# Patient Record
Sex: FEMALE | Race: OTHER | HISPANIC OR LATINO
[De-identification: names, ages, dates, MRNs, and addresses within clinical notes are randomized per-mention and may not be internally consistent; named-entity substitution may affect disease eponyms.]

---

## 2017-01-25 ENCOUNTER — APPOINTMENT (OUTPATIENT)
Dept: ELECTROPHYSIOLOGY | Facility: CLINIC | Age: 82
End: 2017-01-25

## 2017-01-31 ENCOUNTER — APPOINTMENT (OUTPATIENT)
Dept: ELECTROPHYSIOLOGY | Facility: CLINIC | Age: 82
End: 2017-01-31

## 2017-01-31 ENCOUNTER — NON-APPOINTMENT (OUTPATIENT)
Age: 82
End: 2017-01-31

## 2017-01-31 ENCOUNTER — APPOINTMENT (OUTPATIENT)
Dept: CARDIOLOGY | Facility: CLINIC | Age: 82
End: 2017-01-31

## 2017-01-31 VITALS — SYSTOLIC BLOOD PRESSURE: 122 MMHG | DIASTOLIC BLOOD PRESSURE: 69 MMHG | HEART RATE: 87 BPM | OXYGEN SATURATION: 96 %

## 2017-02-16 ENCOUNTER — OUTPATIENT (OUTPATIENT)
Dept: OUTPATIENT SERVICES | Facility: HOSPITAL | Age: 82
LOS: 1 days | Discharge: HOME | End: 2017-02-16

## 2017-02-16 DIAGNOSIS — Z90.710 ACQUIRED ABSENCE OF BOTH CERVIX AND UTERUS: Chronic | ICD-10-CM

## 2017-02-16 DIAGNOSIS — D69.6 THROMBOCYTOPENIA, UNSPECIFIED: ICD-10-CM

## 2017-02-20 ENCOUNTER — RESULT REVIEW (OUTPATIENT)
Age: 82
End: 2017-02-20

## 2017-02-21 ENCOUNTER — APPOINTMENT (OUTPATIENT)
Dept: HEMATOLOGY ONCOLOGY | Facility: CLINIC | Age: 82
End: 2017-02-21

## 2017-02-21 VITALS
BODY MASS INDEX: 24.8 KG/M2 | SYSTOLIC BLOOD PRESSURE: 140 MMHG | TEMPERATURE: 97 F | DIASTOLIC BLOOD PRESSURE: 80 MMHG | HEART RATE: 88 BPM | WEIGHT: 107.14 LBS | HEIGHT: 55 IN | RESPIRATION RATE: 16 BRPM

## 2017-02-21 LAB
BASOPHILS # BLD AUTO: 0 K/UL — SIGNIFICANT CHANGE UP (ref 0–0.2)
BASOPHILS NFR BLD AUTO: 0.8 % — SIGNIFICANT CHANGE UP (ref 0–2)
EOSINOPHIL # BLD AUTO: 0 K/UL — SIGNIFICANT CHANGE UP (ref 0–0.5)
EOSINOPHIL NFR BLD AUTO: 1.1 % — SIGNIFICANT CHANGE UP (ref 0–6)
HCT VFR BLD CALC: 35.7 % — SIGNIFICANT CHANGE UP (ref 34.5–45)
HGB BLD-MCNC: 11.9 G/DL — SIGNIFICANT CHANGE UP (ref 11.5–15.5)
LYMPHOCYTES # BLD AUTO: 0.8 K/UL — LOW (ref 1–3.3)
LYMPHOCYTES # BLD AUTO: 22.6 % — SIGNIFICANT CHANGE UP (ref 13–44)
MCHC RBC-ENTMCNC: 28.7 PG — SIGNIFICANT CHANGE UP (ref 27–34)
MCHC RBC-ENTMCNC: 33.5 G/DL — SIGNIFICANT CHANGE UP (ref 32–36)
MCV RBC AUTO: 85.9 FL — SIGNIFICANT CHANGE UP (ref 80–100)
MONOCYTES # BLD AUTO: 1 K/UL — HIGH (ref 0–0.9)
MONOCYTES NFR BLD AUTO: 29 % — HIGH (ref 2–14)
NEUTROPHILS # BLD AUTO: 1.6 K/UL — LOW (ref 1.8–7.4)
NEUTROPHILS NFR BLD AUTO: 46.4 % — SIGNIFICANT CHANGE UP (ref 43–77)
PLATELET # BLD AUTO: 88 K/UL — LOW (ref 150–400)
RBC # BLD: 4.15 M/UL — SIGNIFICANT CHANGE UP (ref 3.8–5.2)
RBC # FLD: 16.7 % — HIGH (ref 10.3–14.5)
WBC # BLD: 3.3 K/UL — LOW (ref 3.8–10.5)
WBC # FLD AUTO: 3.3 K/UL — LOW (ref 3.8–10.5)

## 2017-02-28 ENCOUNTER — APPOINTMENT (OUTPATIENT)
Dept: ELECTROPHYSIOLOGY | Facility: CLINIC | Age: 82
End: 2017-02-28

## 2017-03-06 ENCOUNTER — RESULT REVIEW (OUTPATIENT)
Age: 82
End: 2017-03-06

## 2017-03-07 ENCOUNTER — APPOINTMENT (OUTPATIENT)
Dept: HEMATOLOGY ONCOLOGY | Facility: CLINIC | Age: 82
End: 2017-03-07

## 2017-03-07 VITALS
BODY MASS INDEX: 28.4 KG/M2 | SYSTOLIC BLOOD PRESSURE: 163 MMHG | DIASTOLIC BLOOD PRESSURE: 63 MMHG | RESPIRATION RATE: 16 BRPM | OXYGEN SATURATION: 98 % | TEMPERATURE: 98.8 F | WEIGHT: 112.43 LBS | HEART RATE: 75 BPM

## 2017-03-07 LAB
ANISOCYTOSIS BLD QL: SLIGHT — SIGNIFICANT CHANGE UP
BASO STIPL BLD QL SMEAR: PRESENT — SIGNIFICANT CHANGE UP
BASOPHILS # BLD AUTO: 0 K/UL — SIGNIFICANT CHANGE UP (ref 0–0.2)
EOSINOPHIL # BLD AUTO: 0.2 K/UL — SIGNIFICANT CHANGE UP (ref 0–0.5)
HCT VFR BLD CALC: 33.6 % — LOW (ref 34.5–45)
HGB BLD-MCNC: 11.3 G/DL — LOW (ref 11.5–15.5)
LYMPHOCYTES # BLD AUTO: 1 K/UL — SIGNIFICANT CHANGE UP (ref 1–3.3)
LYMPHOCYTES # BLD AUTO: 17 % — SIGNIFICANT CHANGE UP (ref 13–44)
MACROCYTES BLD QL: SLIGHT — SIGNIFICANT CHANGE UP
MCHC RBC-ENTMCNC: 28.4 PG — SIGNIFICANT CHANGE UP (ref 27–34)
MCHC RBC-ENTMCNC: 33.6 G/DL — SIGNIFICANT CHANGE UP (ref 32–36)
MCV RBC AUTO: 84.6 FL — SIGNIFICANT CHANGE UP (ref 80–100)
MONOCYTES # BLD AUTO: 2.4 K/UL — HIGH (ref 0–0.9)
MONOCYTES NFR BLD AUTO: 39 % — HIGH (ref 2–14)
NEUTROPHILS # BLD AUTO: 3.2 K/UL — SIGNIFICANT CHANGE UP (ref 1.8–7.4)
NEUTROPHILS NFR BLD AUTO: 44 % — SIGNIFICANT CHANGE UP (ref 43–77)
NRBC # BLD: 1 /100 — HIGH (ref 0–0)
PLAT MORPH BLD: NORMAL — SIGNIFICANT CHANGE UP
PLATELET # BLD AUTO: 73 K/UL — LOW (ref 150–400)
POLYCHROMASIA BLD QL SMEAR: SLIGHT — SIGNIFICANT CHANGE UP
RBC # BLD: 3.97 M/UL — SIGNIFICANT CHANGE UP (ref 3.8–5.2)
RBC # FLD: 17.1 % — HIGH (ref 10.3–14.5)
RBC BLD AUTO: SIGNIFICANT CHANGE UP
WBC # BLD: 6.8 K/UL — SIGNIFICANT CHANGE UP (ref 3.8–10.5)
WBC # FLD AUTO: 6.8 K/UL — SIGNIFICANT CHANGE UP (ref 3.8–10.5)

## 2017-05-25 ENCOUNTER — APPOINTMENT (OUTPATIENT)
Dept: CARDIOLOGY | Facility: CLINIC | Age: 82
End: 2017-05-25

## 2017-05-25 ENCOUNTER — NON-APPOINTMENT (OUTPATIENT)
Age: 82
End: 2017-05-25

## 2017-05-25 ENCOUNTER — APPOINTMENT (OUTPATIENT)
Dept: ELECTROPHYSIOLOGY | Facility: CLINIC | Age: 82
End: 2017-05-25

## 2017-05-25 VITALS — SYSTOLIC BLOOD PRESSURE: 110 MMHG | DIASTOLIC BLOOD PRESSURE: 66 MMHG

## 2017-05-25 VITALS — HEART RATE: 88 BPM | DIASTOLIC BLOOD PRESSURE: 66 MMHG | SYSTOLIC BLOOD PRESSURE: 164 MMHG

## 2017-05-27 LAB
ALBUMIN MFR SERPL ELPH: 53.3 %
ALBUMIN SERPL ELPH-MCNC: 4.3 G/DL
ALBUMIN SERPL-MCNC: 4.2 G/DL
ALBUMIN/GLOB SERPL: 1.2 RATIO
ALBUPE: 81.3 %
ALP BLD-CCNC: 52 U/L
ALPHA1 GLOB MFR SERPL ELPH: 3.7 %
ALPHA1 GLOB SERPL ELPH-MCNC: 0.3 G/DL
ALPHA1UPE: 4.3 %
ALPHA2 GLOB MFR SERPL ELPH: 11.5 %
ALPHA2 GLOB SERPL ELPH-MCNC: 0.9 G/DL
ALPHA2UPE: 3 %
ALT SERPL-CCNC: 10 U/L
ANION GAP SERPL CALC-SCNC: 16 MMOL/L
AST SERPL-CCNC: 17 U/L
B-GLOBULIN MFR SERPL ELPH: 12.5 %
B-GLOBULIN SERPL ELPH-MCNC: 1 G/DL
BETAUPE: 6.4 %
BILIRUB SERPL-MCNC: 0.8 MG/DL
BUN SERPL-MCNC: 18 MG/DL
CALCIUM SERPL-MCNC: 9.2 MG/DL
CHLORIDE SERPL-SCNC: 104 MMOL/L
CO2 SERPL-SCNC: 24 MMOL/L
CREAT 24H UR-MCNC: NORMAL G/24 H
CREAT SERPL-MCNC: 0.93 MG/DL
CREATININE UR (MAYO): 85 MG/DL
DEPRECATED KAPPA LC FREE/LAMBDA SER: 1.42 RATIO
FOLATE RBC-MCNC: 1121 NG/ML
GAMMA GLOB FLD ELPH-MCNC: 1.5 G/DL
GAMMA GLOB MFR SERPL ELPH: 19 %
GAMMAUPE: 5 %
GLUCOSE SERPL-MCNC: 101 MG/DL
HCT VFR BLD CALC: 37 %
IGA 24H UR QL IFE: NORMAL
INTERPRETATION SERPL IEP-IMP: NORMAL
KAPPA LC 24H UR QL: NORMAL
KAPPA LC CSF-MCNC: 3.26 MG/DL
KAPPA LC SERPL-MCNC: 4.63 MG/DL
METHYLMALONATE SERPL-SCNC: 324 NMOL/L
POTASSIUM SERPL-SCNC: 4.4 MMOL/L
PROT PATTERN 24H UR ELPH-IMP: NORMAL
PROT SERPL-MCNC: 7.8 G/DL
PROT UR-MCNC: 194 MG/DL
PROT UR-MCNC: 194 MG/DL
SODIUM SERPL-SCNC: 144 MMOL/L
SPECIMEN VOL 24H UR: NORMAL ML

## 2017-05-30 ENCOUNTER — APPOINTMENT (OUTPATIENT)
Dept: VASCULAR SURGERY | Facility: CLINIC | Age: 82
End: 2017-05-30

## 2017-05-31 ENCOUNTER — OUTPATIENT (OUTPATIENT)
Dept: OUTPATIENT SERVICES | Facility: HOSPITAL | Age: 82
LOS: 1 days | Discharge: HOME | End: 2017-05-31

## 2017-05-31 DIAGNOSIS — D69.6 THROMBOCYTOPENIA, UNSPECIFIED: ICD-10-CM

## 2017-05-31 DIAGNOSIS — Z90.710 ACQUIRED ABSENCE OF BOTH CERVIX AND UTERUS: Chronic | ICD-10-CM

## 2017-06-05 ENCOUNTER — APPOINTMENT (OUTPATIENT)
Dept: VASCULAR SURGERY | Facility: CLINIC | Age: 82
End: 2017-06-05

## 2017-06-05 VITALS
HEART RATE: 80 BPM | BODY MASS INDEX: 25.92 KG/M2 | HEIGHT: 55 IN | TEMPERATURE: 98.3 F | DIASTOLIC BLOOD PRESSURE: 75 MMHG | WEIGHT: 112 LBS | SYSTOLIC BLOOD PRESSURE: 166 MMHG

## 2017-06-06 ENCOUNTER — APPOINTMENT (OUTPATIENT)
Dept: HEMATOLOGY ONCOLOGY | Facility: CLINIC | Age: 82
End: 2017-06-06

## 2017-07-07 ENCOUNTER — OUTPATIENT (OUTPATIENT)
Dept: OUTPATIENT SERVICES | Facility: HOSPITAL | Age: 82
LOS: 1 days | Discharge: HOME | End: 2017-07-07

## 2017-07-07 DIAGNOSIS — D69.6 THROMBOCYTOPENIA, UNSPECIFIED: ICD-10-CM

## 2017-07-07 DIAGNOSIS — Z90.710 ACQUIRED ABSENCE OF BOTH CERVIX AND UTERUS: Chronic | ICD-10-CM

## 2017-07-10 ENCOUNTER — APPOINTMENT (OUTPATIENT)
Dept: HEMATOLOGY ONCOLOGY | Facility: CLINIC | Age: 82
End: 2017-07-10

## 2017-07-12 ENCOUNTER — APPOINTMENT (OUTPATIENT)
Dept: HEMATOLOGY ONCOLOGY | Facility: CLINIC | Age: 82
End: 2017-07-12

## 2017-08-07 ENCOUNTER — RX RENEWAL (OUTPATIENT)
Age: 82
End: 2017-08-07

## 2017-08-29 ENCOUNTER — OUTPATIENT (OUTPATIENT)
Dept: OUTPATIENT SERVICES | Facility: HOSPITAL | Age: 82
LOS: 1 days | Discharge: HOME | End: 2017-08-29

## 2017-08-29 DIAGNOSIS — D69.6 THROMBOCYTOPENIA, UNSPECIFIED: ICD-10-CM

## 2017-08-29 DIAGNOSIS — Z90.710 ACQUIRED ABSENCE OF BOTH CERVIX AND UTERUS: Chronic | ICD-10-CM

## 2017-09-05 ENCOUNTER — APPOINTMENT (OUTPATIENT)
Dept: HEMATOLOGY ONCOLOGY | Facility: CLINIC | Age: 82
End: 2017-09-05
Payer: COMMERCIAL

## 2017-09-05 ENCOUNTER — RESULT REVIEW (OUTPATIENT)
Age: 82
End: 2017-09-05

## 2017-09-05 VITALS
SYSTOLIC BLOOD PRESSURE: 162 MMHG | TEMPERATURE: 97.9 F | RESPIRATION RATE: 16 BRPM | BODY MASS INDEX: 27.28 KG/M2 | DIASTOLIC BLOOD PRESSURE: 60 MMHG | OXYGEN SATURATION: 98 % | WEIGHT: 108.02 LBS | HEART RATE: 68 BPM

## 2017-09-05 LAB
BASOPHILS # BLD AUTO: 0 K/UL — SIGNIFICANT CHANGE UP (ref 0–0.2)
BASOPHILS NFR BLD AUTO: 0.1 % — SIGNIFICANT CHANGE UP (ref 0–2)
EOSINOPHIL # BLD AUTO: 0 K/UL — SIGNIFICANT CHANGE UP (ref 0–0.5)
EOSINOPHIL NFR BLD AUTO: 1 % — SIGNIFICANT CHANGE UP (ref 0–6)
HCT VFR BLD CALC: 33.9 % — LOW (ref 34.5–45)
HGB BLD-MCNC: 11.4 G/DL — LOW (ref 11.5–15.5)
LYMPHOCYTES # BLD AUTO: 1 K/UL — SIGNIFICANT CHANGE UP (ref 1–3.3)
LYMPHOCYTES # BLD AUTO: 24.1 % — SIGNIFICANT CHANGE UP (ref 13–44)
MCHC RBC-ENTMCNC: 27.9 PG — SIGNIFICANT CHANGE UP (ref 27–34)
MCHC RBC-ENTMCNC: 33.6 G/DL — SIGNIFICANT CHANGE UP (ref 32–36)
MCV RBC AUTO: 83.1 FL — SIGNIFICANT CHANGE UP (ref 80–100)
MONOCYTES # BLD AUTO: 1.5 K/UL — HIGH (ref 0–0.9)
MONOCYTES NFR BLD AUTO: 35 % — HIGH (ref 2–14)
NEUTROPHILS # BLD AUTO: 1.7 K/UL — LOW (ref 1.8–7.4)
NEUTROPHILS NFR BLD AUTO: 39.9 % — LOW (ref 43–77)
PLATELET # BLD AUTO: 73 K/UL — LOW (ref 150–400)
RBC # BLD: 4.08 M/UL — SIGNIFICANT CHANGE UP (ref 3.8–5.2)
RBC # FLD: 18.3 % — HIGH (ref 10.3–14.5)
WBC # BLD: 4.3 K/UL — SIGNIFICANT CHANGE UP (ref 3.8–10.5)
WBC # FLD AUTO: 4.3 K/UL — SIGNIFICANT CHANGE UP (ref 3.8–10.5)

## 2017-09-05 PROCEDURE — 99213 OFFICE O/P EST LOW 20 MIN: CPT

## 2017-11-28 ENCOUNTER — NON-APPOINTMENT (OUTPATIENT)
Age: 82
End: 2017-11-28

## 2017-11-28 ENCOUNTER — APPOINTMENT (OUTPATIENT)
Dept: ELECTROPHYSIOLOGY | Facility: CLINIC | Age: 82
End: 2017-11-28
Payer: COMMERCIAL

## 2017-11-28 ENCOUNTER — APPOINTMENT (OUTPATIENT)
Dept: CARDIOLOGY | Facility: CLINIC | Age: 82
End: 2017-11-28
Payer: COMMERCIAL

## 2017-11-28 VITALS
BODY MASS INDEX: 27.28 KG/M2 | HEART RATE: 94 BPM | SYSTOLIC BLOOD PRESSURE: 153 MMHG | OXYGEN SATURATION: 97 % | DIASTOLIC BLOOD PRESSURE: 72 MMHG | WEIGHT: 108 LBS

## 2017-11-28 VITALS — SYSTOLIC BLOOD PRESSURE: 132 MMHG | DIASTOLIC BLOOD PRESSURE: 70 MMHG

## 2017-11-28 PROCEDURE — 99215 OFFICE O/P EST HI 40 MIN: CPT

## 2017-11-28 PROCEDURE — 93280 PM DEVICE PROGR EVAL DUAL: CPT

## 2018-01-01 ENCOUNTER — OUTPATIENT (OUTPATIENT)
Dept: OUTPATIENT SERVICES | Facility: HOSPITAL | Age: 83
LOS: 1 days | End: 2018-01-01
Payer: MEDICAID

## 2018-01-01 DIAGNOSIS — Z90.710 ACQUIRED ABSENCE OF BOTH CERVIX AND UTERUS: Chronic | ICD-10-CM

## 2018-01-01 PROCEDURE — G9001: CPT

## 2018-01-10 ENCOUNTER — EMERGENCY (EMERGENCY)
Facility: HOSPITAL | Age: 83
LOS: 1 days | Discharge: HOME | End: 2018-01-10
Attending: EMERGENCY MEDICINE | Admitting: EMERGENCY MEDICINE
Payer: MEDICARE

## 2018-01-10 VITALS — HEART RATE: 88 BPM | DIASTOLIC BLOOD PRESSURE: 84 MMHG | SYSTOLIC BLOOD PRESSURE: 170 MMHG | OXYGEN SATURATION: 92 %

## 2018-01-10 DIAGNOSIS — Z90.710 ACQUIRED ABSENCE OF BOTH CERVIX AND UTERUS: Chronic | ICD-10-CM

## 2018-01-10 LAB
ALBUMIN SERPL ELPH-MCNC: 4.2 G/DL — SIGNIFICANT CHANGE UP (ref 3.3–5)
ALP SERPL-CCNC: 54 U/L — SIGNIFICANT CHANGE UP (ref 40–120)
ALT FLD-CCNC: 9 U/L RC — LOW (ref 10–45)
ANION GAP SERPL CALC-SCNC: 17 MMOL/L — SIGNIFICANT CHANGE UP (ref 5–17)
AST SERPL-CCNC: 20 U/L — SIGNIFICANT CHANGE UP (ref 10–40)
BASOPHILS # BLD AUTO: 0 K/UL — SIGNIFICANT CHANGE UP (ref 0–0.2)
BILIRUB SERPL-MCNC: 0.5 MG/DL — SIGNIFICANT CHANGE UP (ref 0.2–1.2)
BUN SERPL-MCNC: 18 MG/DL — SIGNIFICANT CHANGE UP (ref 7–23)
CALCIUM SERPL-MCNC: 9.3 MG/DL — SIGNIFICANT CHANGE UP (ref 8.4–10.5)
CHLORIDE SERPL-SCNC: 101 MMOL/L — SIGNIFICANT CHANGE UP (ref 96–108)
CO2 SERPL-SCNC: 22 MMOL/L — SIGNIFICANT CHANGE UP (ref 22–31)
CREAT SERPL-MCNC: 1.14 MG/DL — SIGNIFICANT CHANGE UP (ref 0.5–1.3)
EOSINOPHIL # BLD AUTO: 0 K/UL — SIGNIFICANT CHANGE UP (ref 0–0.5)
EOSINOPHIL NFR BLD AUTO: 3 % — SIGNIFICANT CHANGE UP (ref 0–6)
GLUCOSE SERPL-MCNC: 205 MG/DL — HIGH (ref 70–99)
HCT VFR BLD CALC: 34.1 % — LOW (ref 34.5–45)
HGB BLD-MCNC: 11 G/DL — LOW (ref 11.5–15.5)
LIDOCAIN IGE QN: 34 U/L — SIGNIFICANT CHANGE UP (ref 7–60)
LYMPHOCYTES # BLD AUTO: 0.8 K/UL — LOW (ref 1–3.3)
LYMPHOCYTES # BLD AUTO: 8 % — LOW (ref 13–44)
MCHC RBC-ENTMCNC: 27.8 PG — SIGNIFICANT CHANGE UP (ref 27–34)
MCHC RBC-ENTMCNC: 32.2 GM/DL — SIGNIFICANT CHANGE UP (ref 32–36)
MCV RBC AUTO: 86.5 FL — SIGNIFICANT CHANGE UP (ref 80–100)
MONOCYTES # BLD AUTO: 2.6 K/UL — HIGH (ref 0–0.9)
MONOCYTES NFR BLD AUTO: 14 % — SIGNIFICANT CHANGE UP (ref 2–14)
NEUTROPHILS # BLD AUTO: 9 K/UL — HIGH (ref 1.8–7.4)
NEUTROPHILS NFR BLD AUTO: 75 % — SIGNIFICANT CHANGE UP (ref 43–77)
PLATELET # BLD AUTO: 105 K/UL — LOW (ref 150–400)
POTASSIUM SERPL-MCNC: 3.6 MMOL/L — SIGNIFICANT CHANGE UP (ref 3.5–5.3)
POTASSIUM SERPL-SCNC: 3.6 MMOL/L — SIGNIFICANT CHANGE UP (ref 3.5–5.3)
PROT SERPL-MCNC: 7.7 G/DL — SIGNIFICANT CHANGE UP (ref 6–8.3)
RBC # BLD: 3.95 M/UL — SIGNIFICANT CHANGE UP (ref 3.8–5.2)
RBC # FLD: 17.5 % — HIGH (ref 10.3–14.5)
SODIUM SERPL-SCNC: 140 MMOL/L — SIGNIFICANT CHANGE UP (ref 135–145)
TROPONIN T SERPL-MCNC: <0.01 NG/ML — SIGNIFICANT CHANGE UP (ref 0–0.06)
WBC # BLD: 12.4 K/UL — HIGH (ref 3.8–10.5)
WBC # FLD AUTO: 12.4 K/UL — HIGH (ref 3.8–10.5)

## 2018-01-10 PROCEDURE — 71045 X-RAY EXAM CHEST 1 VIEW: CPT | Mod: 26

## 2018-01-10 PROCEDURE — 93010 ELECTROCARDIOGRAM REPORT: CPT

## 2018-01-10 PROCEDURE — 99285 EMERGENCY DEPT VISIT HI MDM: CPT | Mod: 25

## 2018-01-10 RX ORDER — SODIUM CHLORIDE 9 MG/ML
1000 INJECTION INTRAMUSCULAR; INTRAVENOUS; SUBCUTANEOUS ONCE
Qty: 0 | Refills: 0 | Status: COMPLETED | OUTPATIENT
Start: 2018-01-10 | End: 2018-01-10

## 2018-01-10 RX ADMIN — SODIUM CHLORIDE 1000 MILLILITER(S): 9 INJECTION INTRAMUSCULAR; INTRAVENOUS; SUBCUTANEOUS at 21:49

## 2018-01-10 NOTE — ED PROVIDER NOTE - MEDICAL DECISION MAKING DETAILS
89 yo F presents with episode of vomiting/diarrhea after eating eggs. she briefly had epigastric pain  but denies any at this time. PE normal with no abdominal TTP. VS stable.   ED workup 89 yo F presents with episode of vomiting/diarrhea after eating eggs. she briefly had epigastric pain  but denies any at this time. PE normal with no abdominal TTP. VS stable.   ED workup CXR with NAD. EKG no acute ischemic changes. UA with no infection. mild leukocytosis 12.4, but no source of infection found. mild anemia 11 and 34. labs otherwise unremarkable. delta troponin negative. pt was treated with iVFs in the ED. she remained asymptomatic without abdominal pain or vomiting int eh ER. she was observed eating and drinking without any N/V/D. she was ambulating around the ER and VS stable. no indication for CT a/p at this time as pt with no abd pain or TTP. pt was discharged with instructions to drink plenty of fluids and f/u with PMD in 1-2 days for repeat eval and further management    The patient was discharged from the ED in stable condition. All results of today's workup were discussed with the patient and all questions/concerns were addressed. All discharge instructions were thoroughly discussed with the patient, as well as important warning signs and new/ worsening symptoms which should necessitate patient's immediate return to the ED. The patient is agreeable with discharge and expresses full understanding of all instructions given.

## 2018-01-10 NOTE — ED PROVIDER NOTE - OBJECTIVE STATEMENT
89 yo F w/PMH macular degeneration, a-fib and HTN presenting for ABD pain. This morning pt went to doctor for steroid injection for macular degeneration. She did not eat anything until this evening. Pt had vomiting x1 and diarrhea x1 1 hour after eating eggs and then subsequently fainted. Her grandson caught her before she hit the ground. After the episode pt started feeling epigastric abdominal pain. Grandson also ate the eggs and feels fine. 1 month ago pt completed course of ABX for respiratory infection. Denies cough, blood in stool or dark stools, fevers, difficulty urinating, CP. 89 yo F w/PMH macular degeneration, a-fib and HTN presenting for ABD pain. This morning pt went to doctor for steroid injection for macular degeneration. She did not eat anything until this evening. Then patient went home age eggs, and 1 hour later had an episode of vomiting x1 and diarrhea x1. After the episode pt briefly felt epigastric pain, but resolved at this time. patient hadn't eaten all day before this. patient had no complaints prior to this V/D. She feels well at this time with no complaints. Son also ate the eggs and feels fine. 1 month ago pt completed course of ABX for respiratory infection. Denies cough, blood in stool or dark stools, fevers, difficulty urinating, CP.  patient is primarly spanich speaking, she declines  services and would like her son to translate 89 yo F w/PMH macular degeneration, a-fib and HTN presenting for V/D. This morning pt went to doctor for steroid injection for macular degeneration. She did not eat anything until this evening. Then patient went home ate eggs, and 1 hour later had an episode of vomiting x1 and diarrhea x1. After the episode pt briefly felt epigastric pain, but resolved at this time. patient hadn't eaten all day before this. patient had no complaints prior to this V/D. She feels well at this time with no complaints. 1 month ago pt completed course of ABX for respiratory infection. Denies cough, blood in stool or dark stools, fevers, difficulty urinating, CP.  patient is primarly spanich speaking, she declines  services and would like her son to translate

## 2018-01-10 NOTE — ED PROVIDER NOTE - CHIEF COMPLAINT
The patient is a 89y Female complaining of abdominal pain. The patient is a 89y Female complaining of V/D

## 2018-01-10 NOTE — ED ADULT NURSE NOTE - CHPI ED SYMPTOMS NEG
no hematuria/no fever/no blood in stool/no burning urination/no chills/no dysuria/no abdominal distension

## 2018-01-10 NOTE — ED ADULT NURSE NOTE - OBJECTIVE STATEMENT
pt is a 89 yr F presents with one hour of n/v/d, as per family pt had gone to her eye doctor to get a steroid injection and had not eaten anything all day. when she got home she had some eggs and tortilla. one hour after eating pt starting vomiting and had multiple episodes of diarrhea that she didn't make it to the bathroom for. pt while having BM had vasovagal episode for 2-3seconds. did not hit head, family caught her. pt reports current nausea, no longer vomiting. denies pain. abd soft, non-tender. denies cp/sob/fever/chills/dysuria/sick contacts. family at bedside, EKG complete.

## 2018-01-11 VITALS
HEART RATE: 75 BPM | DIASTOLIC BLOOD PRESSURE: 68 MMHG | SYSTOLIC BLOOD PRESSURE: 148 MMHG | OXYGEN SATURATION: 96 % | RESPIRATION RATE: 18 BRPM

## 2018-01-11 DIAGNOSIS — R69 ILLNESS, UNSPECIFIED: ICD-10-CM

## 2018-01-11 LAB
APPEARANCE UR: CLEAR — SIGNIFICANT CHANGE UP
BILIRUB UR-MCNC: NEGATIVE — SIGNIFICANT CHANGE UP
COLOR SPEC: YELLOW — SIGNIFICANT CHANGE UP
COMMENT - URINE: SIGNIFICANT CHANGE UP
CULTURE RESULTS: NO GROWTH — SIGNIFICANT CHANGE UP
DIFF PNL FLD: ABNORMAL
EPI CELLS # UR: SIGNIFICANT CHANGE UP /HPF
GLUCOSE UR QL: NEGATIVE — SIGNIFICANT CHANGE UP
HYALINE CASTS # UR AUTO: ABNORMAL
KETONES UR-MCNC: ABNORMAL
LEUKOCYTE ESTERASE UR-ACNC: NEGATIVE — SIGNIFICANT CHANGE UP
NITRITE UR-MCNC: NEGATIVE — SIGNIFICANT CHANGE UP
PH UR: 6.5 — SIGNIFICANT CHANGE UP (ref 5–8)
PROT UR-MCNC: 300 MG/DL
RBC CASTS # UR COMP ASSIST: ABNORMAL /HPF (ref 0–2)
SP GR SPEC: 1.02 — SIGNIFICANT CHANGE UP (ref 1.01–1.02)
SPECIMEN SOURCE: SIGNIFICANT CHANGE UP
TROPONIN T SERPL-MCNC: <0.01 NG/ML — SIGNIFICANT CHANGE UP (ref 0–0.06)
UROBILINOGEN FLD QL: NEGATIVE — SIGNIFICANT CHANGE UP
WBC UR QL: SIGNIFICANT CHANGE UP /HPF (ref 0–5)

## 2018-01-11 PROCEDURE — 87086 URINE CULTURE/COLONY COUNT: CPT

## 2018-01-11 PROCEDURE — 85027 COMPLETE CBC AUTOMATED: CPT

## 2018-01-11 PROCEDURE — 99283 EMERGENCY DEPT VISIT LOW MDM: CPT | Mod: 25

## 2018-01-11 PROCEDURE — 80053 COMPREHEN METABOLIC PANEL: CPT

## 2018-01-11 PROCEDURE — 82962 GLUCOSE BLOOD TEST: CPT

## 2018-01-11 PROCEDURE — 81001 URINALYSIS AUTO W/SCOPE: CPT

## 2018-01-11 PROCEDURE — 71045 X-RAY EXAM CHEST 1 VIEW: CPT

## 2018-01-11 PROCEDURE — 93005 ELECTROCARDIOGRAM TRACING: CPT

## 2018-01-11 PROCEDURE — 84484 ASSAY OF TROPONIN QUANT: CPT

## 2018-01-11 PROCEDURE — 83690 ASSAY OF LIPASE: CPT

## 2018-03-01 ENCOUNTER — OUTPATIENT (OUTPATIENT)
Dept: OUTPATIENT SERVICES | Facility: HOSPITAL | Age: 83
LOS: 1 days | Discharge: HOME | End: 2018-03-01

## 2018-03-01 DIAGNOSIS — D69.6 THROMBOCYTOPENIA, UNSPECIFIED: ICD-10-CM

## 2018-03-01 DIAGNOSIS — Z90.710 ACQUIRED ABSENCE OF BOTH CERVIX AND UTERUS: Chronic | ICD-10-CM

## 2018-03-06 ENCOUNTER — RESULT REVIEW (OUTPATIENT)
Age: 83
End: 2018-03-06

## 2018-03-06 ENCOUNTER — APPOINTMENT (OUTPATIENT)
Dept: HEMATOLOGY ONCOLOGY | Facility: CLINIC | Age: 83
End: 2018-03-06
Payer: COMMERCIAL

## 2018-03-06 VITALS
TEMPERATURE: 98 F | RESPIRATION RATE: 16 BRPM | WEIGHT: 104.6 LBS | BODY MASS INDEX: 26.42 KG/M2 | OXYGEN SATURATION: 99 % | DIASTOLIC BLOOD PRESSURE: 69 MMHG | HEART RATE: 73 BPM | SYSTOLIC BLOOD PRESSURE: 171 MMHG

## 2018-03-06 LAB
HCT VFR BLD CALC: 30.5 % — LOW (ref 34.5–45)
HGB BLD-MCNC: 10.5 G/DL — LOW (ref 11.5–15.5)
LYMPHOCYTES # BLD AUTO: 28 % — SIGNIFICANT CHANGE UP (ref 13–44)
LYMPHOCYTES # BLD AUTO: SIGNIFICANT CHANGE UP (ref 1–3.3)
MCHC RBC-ENTMCNC: 29 PG — SIGNIFICANT CHANGE UP (ref 27–34)
MCHC RBC-ENTMCNC: 34.4 G/DL — SIGNIFICANT CHANGE UP (ref 32–36)
MCV RBC AUTO: 84.3 FL — SIGNIFICANT CHANGE UP (ref 80–100)
MONOCYTES NFR BLD AUTO: 22 % — HIGH (ref 2–14)
NEUTROPHILS # BLD AUTO: 3.2 K/UL — SIGNIFICANT CHANGE UP (ref 1.8–7.4)
NEUTROPHILS NFR BLD AUTO: 50 % — SIGNIFICANT CHANGE UP (ref 43–77)
PLAT MORPH BLD: NORMAL — SIGNIFICANT CHANGE UP
PLATELET # BLD AUTO: 76 K/UL — LOW (ref 150–400)
RBC # BLD: 3.62 M/UL — LOW (ref 3.8–5.2)
RBC # FLD: 17.8 % — HIGH (ref 10.3–14.5)
RBC BLD AUTO: SIGNIFICANT CHANGE UP
WBC # BLD: 6.7 K/UL — SIGNIFICANT CHANGE UP (ref 3.8–10.5)
WBC # FLD AUTO: 6.7 K/UL — SIGNIFICANT CHANGE UP (ref 3.8–10.5)

## 2018-03-06 PROCEDURE — 99214 OFFICE O/P EST MOD 30 MIN: CPT

## 2018-05-01 ENCOUNTER — OUTPATIENT (OUTPATIENT)
Dept: OUTPATIENT SERVICES | Facility: HOSPITAL | Age: 83
LOS: 1 days | End: 2018-05-01
Payer: MEDICAID

## 2018-05-01 DIAGNOSIS — Z90.710 ACQUIRED ABSENCE OF BOTH CERVIX AND UTERUS: Chronic | ICD-10-CM

## 2018-05-01 PROCEDURE — G9001: CPT

## 2018-05-05 DIAGNOSIS — R69 ILLNESS, UNSPECIFIED: ICD-10-CM

## 2018-05-12 ENCOUNTER — INPATIENT (INPATIENT)
Facility: HOSPITAL | Age: 83
LOS: 1 days | Discharge: HOME | DRG: 392 | End: 2018-05-14
Attending: INTERNAL MEDICINE | Admitting: HOSPITALIST
Payer: MEDICARE

## 2018-05-12 VITALS
HEART RATE: 92 BPM | TEMPERATURE: 98 F | RESPIRATION RATE: 16 BRPM | OXYGEN SATURATION: 98 % | SYSTOLIC BLOOD PRESSURE: 163 MMHG | DIASTOLIC BLOOD PRESSURE: 82 MMHG

## 2018-05-12 DIAGNOSIS — Z90.710 ACQUIRED ABSENCE OF BOTH CERVIX AND UTERUS: Chronic | ICD-10-CM

## 2018-05-12 LAB
ALBUMIN SERPL ELPH-MCNC: 3.3 G/DL — SIGNIFICANT CHANGE UP (ref 3.3–5)
ALP SERPL-CCNC: 57 U/L — SIGNIFICANT CHANGE UP (ref 40–120)
ALT FLD-CCNC: 13 U/L — SIGNIFICANT CHANGE UP (ref 10–45)
ANION GAP SERPL CALC-SCNC: 15 MMOL/L — SIGNIFICANT CHANGE UP (ref 5–17)
APPEARANCE UR: CLEAR — SIGNIFICANT CHANGE UP
AST SERPL-CCNC: 12 U/L — SIGNIFICANT CHANGE UP (ref 10–40)
BILIRUB SERPL-MCNC: 0.7 MG/DL — SIGNIFICANT CHANGE UP (ref 0.2–1.2)
BILIRUB UR-MCNC: NEGATIVE — SIGNIFICANT CHANGE UP
BUN SERPL-MCNC: 18 MG/DL — SIGNIFICANT CHANGE UP (ref 7–23)
CALCIUM SERPL-MCNC: 8.5 MG/DL — SIGNIFICANT CHANGE UP (ref 8.4–10.5)
CHLORIDE SERPL-SCNC: 95 MMOL/L — LOW (ref 96–108)
CO2 SERPL-SCNC: 22 MMOL/L — SIGNIFICANT CHANGE UP (ref 22–31)
COLOR SPEC: SIGNIFICANT CHANGE UP
CREAT SERPL-MCNC: 0.92 MG/DL — SIGNIFICANT CHANGE UP (ref 0.5–1.3)
DIFF PNL FLD: ABNORMAL
GAS PNL BLDV: SIGNIFICANT CHANGE UP
GLUCOSE SERPL-MCNC: 124 MG/DL — HIGH (ref 70–99)
GLUCOSE UR QL: NEGATIVE — SIGNIFICANT CHANGE UP
HCT VFR BLD CALC: 28.4 % — LOW (ref 34.5–45)
HGB BLD-MCNC: 9.4 G/DL — LOW (ref 11.5–15.5)
KETONES UR-MCNC: NEGATIVE — SIGNIFICANT CHANGE UP
LEUKOCYTE ESTERASE UR-ACNC: NEGATIVE — SIGNIFICANT CHANGE UP
LIDOCAIN IGE QN: 37 U/L — SIGNIFICANT CHANGE UP (ref 7–60)
LYMPHOCYTES # BLD AUTO: 1.5 K/UL — SIGNIFICANT CHANGE UP (ref 1–3.3)
LYMPHOCYTES # BLD AUTO: 7 % — LOW (ref 13–44)
MCHC RBC-ENTMCNC: 28.1 PG — SIGNIFICANT CHANGE UP (ref 27–34)
MCHC RBC-ENTMCNC: 33.1 GM/DL — SIGNIFICANT CHANGE UP (ref 32–36)
MCV RBC AUTO: 84.8 FL — SIGNIFICANT CHANGE UP (ref 80–100)
MONOCYTES # BLD AUTO: 4.6 K/UL — HIGH (ref 0–0.9)
MONOCYTES NFR BLD AUTO: 22 % — HIGH (ref 2–14)
NEUTROPHILS # BLD AUTO: 14.8 K/UL — HIGH (ref 1.8–7.4)
NEUTROPHILS NFR BLD AUTO: 71 % — SIGNIFICANT CHANGE UP (ref 43–77)
NITRITE UR-MCNC: NEGATIVE — SIGNIFICANT CHANGE UP
PH UR: 8 — SIGNIFICANT CHANGE UP (ref 5–8)
PLATELET # BLD AUTO: 140 K/UL — LOW (ref 150–400)
POTASSIUM SERPL-MCNC: 4.1 MMOL/L — SIGNIFICANT CHANGE UP (ref 3.5–5.3)
POTASSIUM SERPL-SCNC: 4.1 MMOL/L — SIGNIFICANT CHANGE UP (ref 3.5–5.3)
PROT SERPL-MCNC: 7.4 G/DL — SIGNIFICANT CHANGE UP (ref 6–8.3)
PROT UR-MCNC: 100 MG/DL
RBC # BLD: 3.34 M/UL — LOW (ref 3.8–5.2)
RBC # FLD: 18.5 % — HIGH (ref 10.3–14.5)
SODIUM SERPL-SCNC: 132 MMOL/L — LOW (ref 135–145)
SP GR SPEC: >1.03 — HIGH (ref 1.01–1.02)
TROPONIN T SERPL-MCNC: <0.01 NG/ML — SIGNIFICANT CHANGE UP (ref 0–0.06)
UROBILINOGEN FLD QL: NEGATIVE — SIGNIFICANT CHANGE UP
WBC # BLD: 20.8 K/UL — HIGH (ref 3.8–10.5)
WBC # FLD AUTO: 20.8 K/UL — HIGH (ref 3.8–10.5)

## 2018-05-12 PROCEDURE — 99285 EMERGENCY DEPT VISIT HI MDM: CPT

## 2018-05-12 PROCEDURE — 74177 CT ABD & PELVIS W/CONTRAST: CPT | Mod: 26

## 2018-05-12 RX ORDER — FAMOTIDINE 10 MG/ML
20 INJECTION INTRAVENOUS ONCE
Qty: 0 | Refills: 0 | Status: COMPLETED | OUTPATIENT
Start: 2018-05-12 | End: 2018-05-12

## 2018-05-12 RX ADMIN — FAMOTIDINE 20 MILLIGRAM(S): 10 INJECTION INTRAVENOUS at 20:05

## 2018-05-12 RX ADMIN — Medication 30 MILLILITER(S): at 20:06

## 2018-05-12 NOTE — ED PROVIDER NOTE - SHIFT CHANGE DETAILS
Awaiting CXR but based on her age and markedly elevated WBC count, she is to be admitted. We will add blood cultures to her work-up and repeat her CBC.

## 2018-05-12 NOTE — ED PROVIDER NOTE - MEDICAL DECISION MAKING DETAILS
Abd pain in the setting of recent abx use and UTI. Abd exam with mild suprapubic tenderness though no urinary sx anymore. Last bm 3 days ago. HD stable, afebrile. r/o SBO, treat symptomatically.   -cbc/cmp/lipase/vbg, ct abd, reassess  Sia Stockton, PGY-1 EM

## 2018-05-12 NOTE — ED PROVIDER NOTE - ATTENDING CONTRIBUTION TO CARE
89 y/o English-speaking (translation provided by family) female with the above documented history and HPI who on exam appears well and comfortable. VSs noted, sclerae anicteric, MM's moist, neck supple, lungs CTA, cardiac sounds s/ audible m/r/g, abdomen soft, ND c/ suprapubic and RLQ ttp s/ rebound or guarding, extremities s/ asymmetry, skin s/ rash or jaundice and neurologically intact. Full investigation initiated. Labs back thus far remarkable only for her elevated WBC count. Awaiting UA and imaging, the results of which will likely dictate her ultimate treatment and disposition.

## 2018-05-12 NOTE — ED PROVIDER NOTE - OBJECTIVE STATEMENT
89 yo F pmh htn, pacemaker placed, AF on coumadin (currently being held) p/w abdominal pain 1 week duration. Pt is Afghan speaking, translation by son at bedside. Reports she recently had a urinary tract infection, currently on bactrim and has not been feeling well since being on the abx. Pt reports the pain was worse tonight. No n/v/d. Believes last bm was 3 days ago. No prior abdominal surgeries. Pain located in the suprapubic region. Decreased PO intake. No urinary sx. No other complaints including chest pain, sob, fever, headache, recent travel.

## 2018-05-12 NOTE — ED ADULT NURSE REASSESSMENT NOTE - NS ED NURSE REASSESS COMMENT FT1
Pt unable to void at this time after attempt, as per MD smith, zach pt okay to drink PO fluids at this time.

## 2018-05-12 NOTE — ED ADULT NURSE NOTE - OBJECTIVE STATEMENT
89 F primarily Vietnamese speaking, c/o abd pain and no BM for 3 days possibly r/t taking new medications. As per son, pt began taking abx for her diagnosed UTI and has had abd pain for 1 week. C/o R shoulder pain caused by arthritis and son states pt is taking new medications r/t that pain as well. As per son, pt has not had a BM for 3 days and normally has 1 BM per day. Son denies n/v. States her coumadin is held for 89 F primarily Burmese speaking, c/o abd pain and no BM for 3 days possibly r/t taking new medications. As per son, pt began taking abx for her diagnosed UTI and has had abd pain for 1 week. C/o R shoulder pain caused by arthritis and son states pt is taking new medications r/t that pain as well. As per son, pt has not had a BM for 3 days and normally has 1 BM per day. Son denies n/v, states pt has been taking Maalox at home to no relief. States her coumadin is held for her to take her new antibiotics. Pt A&Ox3, calm/cooperative, Son at bedside. Pt NAD. 20Ga to LAC. Pt denies chest pain, SOB, fever.

## 2018-05-12 NOTE — ED ADULT NURSE REASSESSMENT NOTE - NS ED NURSE REASSESS COMMENT FT1
Pt tried to use restroom to provide urine sample but has no urine. Pt is given PO fluid as per MD smith to try to make urine.

## 2018-05-13 ENCOUNTER — TRANSCRIPTION ENCOUNTER (OUTPATIENT)
Age: 83
End: 2018-05-13

## 2018-05-13 DIAGNOSIS — R10.9 UNSPECIFIED ABDOMINAL PAIN: ICD-10-CM

## 2018-05-13 DIAGNOSIS — D72.829 ELEVATED WHITE BLOOD CELL COUNT, UNSPECIFIED: ICD-10-CM

## 2018-05-13 DIAGNOSIS — D69.6 THROMBOCYTOPENIA, UNSPECIFIED: ICD-10-CM

## 2018-05-13 DIAGNOSIS — I48.91 UNSPECIFIED ATRIAL FIBRILLATION: ICD-10-CM

## 2018-05-13 DIAGNOSIS — I10 ESSENTIAL (PRIMARY) HYPERTENSION: ICD-10-CM

## 2018-05-13 DIAGNOSIS — Z29.9 ENCOUNTER FOR PROPHYLACTIC MEASURES, UNSPECIFIED: ICD-10-CM

## 2018-05-13 LAB
ANION GAP SERPL CALC-SCNC: 17 MMOL/L — SIGNIFICANT CHANGE UP (ref 5–17)
APTT BLD: 31.9 SEC — SIGNIFICANT CHANGE UP (ref 27.5–37.4)
BUN SERPL-MCNC: 15 MG/DL — SIGNIFICANT CHANGE UP (ref 7–23)
CALCIUM SERPL-MCNC: 8.8 MG/DL — SIGNIFICANT CHANGE UP (ref 8.4–10.5)
CHLORIDE SERPL-SCNC: 94 MMOL/L — LOW (ref 96–108)
CO2 SERPL-SCNC: 21 MMOL/L — LOW (ref 22–31)
CREAT ?TM UR-MCNC: 131 MG/DL — SIGNIFICANT CHANGE UP
CREAT SERPL-MCNC: 0.9 MG/DL — SIGNIFICANT CHANGE UP (ref 0.5–1.3)
GLUCOSE SERPL-MCNC: 77 MG/DL — SIGNIFICANT CHANGE UP (ref 70–99)
HCT VFR BLD CALC: 27.6 % — LOW (ref 34.5–45)
HGB BLD-MCNC: 9.1 G/DL — LOW (ref 11.5–15.5)
INR BLD: 1.36 RATIO — HIGH (ref 0.88–1.16)
LYMPHOCYTES # BLD AUTO: 3 % — LOW (ref 13–44)
MAGNESIUM SERPL-MCNC: 2.1 MG/DL — SIGNIFICANT CHANGE UP (ref 1.6–2.6)
MCHC RBC-ENTMCNC: 27.8 PG — SIGNIFICANT CHANGE UP (ref 27–34)
MCHC RBC-ENTMCNC: 32.9 GM/DL — SIGNIFICANT CHANGE UP (ref 32–36)
MCV RBC AUTO: 84.2 FL — SIGNIFICANT CHANGE UP (ref 80–100)
MONOCYTES NFR BLD AUTO: 31 % — HIGH (ref 2–14)
NEUTROPHILS NFR BLD AUTO: 66 % — SIGNIFICANT CHANGE UP (ref 43–77)
PHOSPHATE SERPL-MCNC: 3 MG/DL — SIGNIFICANT CHANGE UP (ref 2.5–4.5)
PLATELET # BLD AUTO: 136 K/UL — LOW (ref 150–400)
POTASSIUM SERPL-MCNC: 3.9 MMOL/L — SIGNIFICANT CHANGE UP (ref 3.5–5.3)
POTASSIUM SERPL-SCNC: 3.9 MMOL/L — SIGNIFICANT CHANGE UP (ref 3.5–5.3)
PROT ?TM UR-MCNC: 152 MG/DL — HIGH (ref 0–12)
PROT/CREAT UR-RTO: 1.2 RATIO — HIGH (ref 0–0.2)
PROTHROM AB SERPL-ACNC: 14.9 SEC — HIGH (ref 9.8–12.7)
RBC # BLD: 3.28 M/UL — LOW (ref 3.8–5.2)
RBC # FLD: 18.2 % — HIGH (ref 10.3–14.5)
SODIUM SERPL-SCNC: 132 MMOL/L — LOW (ref 135–145)
WBC # BLD: 21.2 K/UL — HIGH (ref 3.8–10.5)
WBC # FLD AUTO: 21.2 K/UL — HIGH (ref 3.8–10.5)

## 2018-05-13 PROCEDURE — 93010 ELECTROCARDIOGRAM REPORT: CPT

## 2018-05-13 PROCEDURE — 71045 X-RAY EXAM CHEST 1 VIEW: CPT | Mod: 26

## 2018-05-13 PROCEDURE — 12345: CPT | Mod: GC,NC

## 2018-05-13 PROCEDURE — 99222 1ST HOSP IP/OBS MODERATE 55: CPT | Mod: GC

## 2018-05-13 PROCEDURE — 99223 1ST HOSP IP/OBS HIGH 75: CPT | Mod: GC

## 2018-05-13 RX ORDER — AMLODIPINE BESYLATE 2.5 MG/1
10 TABLET ORAL DAILY
Qty: 0 | Refills: 0 | Status: DISCONTINUED | OUTPATIENT
Start: 2018-05-13 | End: 2018-05-14

## 2018-05-13 RX ORDER — SIMETHICONE 80 MG/1
80 TABLET, CHEWABLE ORAL EVERY 8 HOURS
Qty: 0 | Refills: 0 | Status: DISCONTINUED | OUTPATIENT
Start: 2018-05-13 | End: 2018-05-14

## 2018-05-13 RX ORDER — FAMOTIDINE 10 MG/ML
1 INJECTION INTRAVENOUS
Qty: 0 | Refills: 0 | DISCHARGE
Start: 2018-05-13

## 2018-05-13 RX ORDER — ACETAMINOPHEN 500 MG
650 TABLET ORAL EVERY 6 HOURS
Qty: 0 | Refills: 0 | Status: DISCONTINUED | OUTPATIENT
Start: 2018-05-13 | End: 2018-05-14

## 2018-05-13 RX ORDER — FAMOTIDINE 10 MG/ML
20 INJECTION INTRAVENOUS DAILY
Qty: 0 | Refills: 0 | Status: DISCONTINUED | OUTPATIENT
Start: 2018-05-13 | End: 2018-05-14

## 2018-05-13 RX ORDER — WARFARIN SODIUM 2.5 MG/1
3 TABLET ORAL ONCE
Qty: 0 | Refills: 0 | Status: COMPLETED | OUTPATIENT
Start: 2018-05-13 | End: 2018-05-13

## 2018-05-13 RX ADMIN — FAMOTIDINE 20 MILLIGRAM(S): 10 INJECTION INTRAVENOUS at 14:53

## 2018-05-13 RX ADMIN — SIMETHICONE 80 MILLIGRAM(S): 80 TABLET, CHEWABLE ORAL at 14:53

## 2018-05-13 RX ADMIN — AMLODIPINE BESYLATE 10 MILLIGRAM(S): 2.5 TABLET ORAL at 06:39

## 2018-05-13 RX ADMIN — WARFARIN SODIUM 3 MILLIGRAM(S): 2.5 TABLET ORAL at 21:40

## 2018-05-13 NOTE — H&P ADULT - NSHPPHYSICALEXAM_GEN_ALL_CORE
GENERAL: Thin female, comfortable, lying in bed in no acute distress   HEAD:  Normocephalic, atraumatic  EYES: EOMI, PERRLA  HEENT: Moist mucous membranes  NECK: Supple, No JVD  CHEST/LUNG: Clear to auscultation bilaterally, normal work of breathing  HEART: Regular rate and rhythm, no murmur   ABDOMEN: Soft, Nontender, Nondistended, Bowel sounds present  EXTREMITIES:   No clubbing, cyanosis, or edema  MUSCULOSKELETAL: No muscle tenderness, no joint tenderness  SKIN: warm and dry, no rash   NERVOUS SYSTEM:  Alert & Oriented X3, conversant, follows commands, no focal deficits

## 2018-05-13 NOTE — H&P ADULT - PROBLEM SELECTOR PLAN 2
c/w home amlodipine Leukocytosis to 21, no associated fever or tachycardia.  No antibiotics given in the ED and will hold for now as no clear source.  Outpatient UTI treated with bactrim for >10 days and CT A/P negative for acute pathology.  - trend WBC  - f/u blood cultures x 2 Leukocytosis to 21, no associated fever or tachycardia.  No antibiotics given in the ED and will hold for now as no clear source.  Outpatient UTI treated with bactrim for >10 days and CT A/P negative for acute pathology.  - trend WBC  - f/u blood cultures x 2  - Consider Heme consult for further work-up given monocytosis and Dr. Steen's outpatient notes suggesting possible MDS

## 2018-05-13 NOTE — DISCHARGE NOTE ADULT - PATIENT PORTAL LINK FT
You can access the [facility] Portal, offered by [facility], by registering with the following website: [email or web address]

## 2018-05-13 NOTE — H&P ADULT - NSHPOUTPATIENTPROVIDERS_GEN_ALL_CORE
Dr. Jhon Steen ([facility]) Dr. Jhon Steen ([facility])  Dr. Laura Schultz (Cardiology) Dr. Jhon Steen ([facility])  Dr. Laura Schultz (Cardiology)

## 2018-05-13 NOTE — H&P ADULT - PROBLEM SELECTOR PLAN 1
Central abdominal pain for 2 weeks with worsening on day of admission, treated symptomatically with maalox and simethicone. Likely etiology is infectious, possibly viral gastroenteritis, given concurrent leukocytosis but no sick contacts and no changes in bowel habits.   CT A/P without any focal pathology  - serial abdominal exams  - f/u blood cultures  - trend WBC   - encourage PO intake as tolerated

## 2018-05-13 NOTE — H&P ADULT - NSHPLABSRESULTS_GEN_ALL_CORE
9.1    21.2  )-----------( 136      ( 13 May 2018 00:52 )             27.6       05-12    132<L>  |  95<L>  |  18  ----------------------------<  124<H>  4.1   |  22  |  0.92    Ca    8.5      12 May 2018 20:13    TPro  7.4  /  Alb  3.3  /  TBili  0.7  /  DBili  x   /  AST  12  /  ALT  13  /  AlkPhos  57  05-12      LIVER FUNCTIONS - ( 12 May 2018 20:13 )  Alb: 3.3 g/dL / Pro: 7.4 g/dL / ALK PHOS: 57 U/L / ALT: 13 U/L / AST: 12 U/L / GGT: x                 RADIOLOGY:  EXAM:  CT ABDOMEN AND PELVIS IC                          PROCEDURE DATE:  05/12/2018    INTERPRETATION:  CLINICAL HISTORY: Abdominal pain.    TECHNIQUE:  CT scan of the abdomen and pelvis with IV contrast.  Transaxial images were acquired from the domes of the diaphragm to the symphysis pubis with intravenous contrast. Oral contrast was withheld as per the referring clinician's request. Coronal and sagittal images were also provided from the transaxial source data. 90mLs of Omnipaque 350 was administered intravenously without complication and 10 mLs was discarded.    COMPARISON: There is no prior study for comparison.    FINDINGS:   The heart is mildly enlarged. Is no pericardial effusion. There is a right middle lobe calcified granuloma.    The large and small bowel are normal in caliber without obstruction. There is no free intraperitoneal air or abdominal abscess.  The appendix is normal. There is no abnormal bowel wall thickening or inflammatory change.    The liver, the gallbladder, spleen, pancreas and adrenal glands are unremarkable aside from few low-attenuation lesions in the liver too small to adequately characterize. The kidneys enhance symmetrically without hydronephrosis.     The abdominal aorta is normal in caliber. There is no retroperitoneal, pelvic or inguinal adenopathy. There is no ascites.    The urinary bladder is low in the pelvis suggesting cystocele. There is no pelvic soft tissue mass.    There are degenerative changes in lumbar spine. The visualized osseous structures demonstrate no acute abnormality.    IMPRESSION:  No acute intra-abdominal or pelvic finding to explain patient's symptomatology.

## 2018-05-13 NOTE — DISCHARGE NOTE ADULT - CARE PLAN
Principal Discharge DX:	Abdominal pain  Goal:	treat pain  Assessment and plan of treatment:	You were admitted to the hospital with abdominal pain. You had a CT abdomen/pelvis which did not show abnormalities that could be responsible for the pain. Your vitals and bloodwork did not suggest infection, so you were monitored without antibiotics. Your pain resolved with pepcid.     Please take tylenol 650 mg as needed for pain. Do not exceed 2800 mg in one day. Please make an appointment to see your PCP within one week of dischage. To manage symptoms, you may apply heat on your abdomen for 20 to 30 minutes every 2 hours for as many days as needed. Heat helps decrease pain and muscle spasms.    Please return to the emergency department if: You vomit blood or cannot stop vomiting. You have blood in your bowel movement or it looks like tar. You have bleeding from your rectum. Your abdomen is larger than usual, more painful, and hard. You have severe pain in your abdomen. You stop passing gas and having bowel movements. You feel weak, dizzy, or faint.  Secondary Diagnosis:	Leukocytosis  Assessment and plan of treatment:	You were found to have an elevated white blood cell count on admission. This may be seen in infection, but may also be seen in certain hematologic conditions. Please continue to follow up regularly with Dr. Steen (hematologist) to have regular blood counts to check your WBC.  Secondary Diagnosis:	Thrombocytopenia  Assessment and plan of treatment:	Please continue to follow up with Dr. Steen to have blood counts to check your platelet levels. Principal Discharge DX:	Abdominal pain  Goal:	treat pain  Assessment and plan of treatment:	You were admitted to the hospital with abdominal pain. You had a CT abdomen/pelvis which did not show abnormalities that could be responsible for the pain. Your vitals and bloodwork did not suggest infection, so you were monitored without antibiotics. Your pain resolved with pepcid.     Please take tylenol 650 mg as needed for pain. Do not exceed 2800 mg in one day. Please make an appointment to see your PCP within one week of dischage. To manage symptoms, you may apply heat on your abdomen for 20 to 30 minutes every 2 hours for as many days as needed. Heat helps decrease pain and muscle spasms.    Please return to the emergency department if: You vomit blood or cannot stop vomiting. You have blood in your bowel movement or it looks like tar. You have bleeding from your rectum. Your abdomen is larger than usual, more painful, and hard. You have severe pain in your abdomen. You stop passing gas and having bowel movements. You feel weak, dizzy, or faint.  Secondary Diagnosis:	Leukocytosis  Assessment and plan of treatment:	You were found to have an elevated white blood cell count on admission. This may be seen in infection, but may also be seen in certain hematologic conditions. Please continue to follow up regularly with Dr. Steen (hematologist) to have regular blood counts to check your WBC.  Secondary Diagnosis:	Thrombocytopenia  Assessment and plan of treatment:	Please continue to follow up with Dr. Steen to have blood counts to check your platelet levels.  Secondary Diagnosis:	Atrial fibrillation  Assessment and plan of treatment:	You have atrial fibrillation and take coumadin regularly. Your INR in the hospital has been low. Please continue to take your normal dose of 3 mg daily, but have your INR checked in 1-2 days by your own PCP so that the dose of coumadin can be adjusted, as needed. Principal Discharge DX:	Abdominal pain  Goal:	treat pain  Assessment and plan of treatment:	You were admitted to the hospital with abdominal pain. You had a CT abdomen/pelvis which did not show abnormalities that could be responsible for the pain. Your vitals and bloodwork did not suggest infection, so you were monitored without antibiotics. Your pain resolved with pepcid.     Please take tylenol 650 mg as needed for pain. Do not exceed 2800 mg in one day. Please make an appointment to see your PCP within one week of dischage. To manage symptoms, you may apply heat on your abdomen for 20 to 30 minutes every 2 hours for as many days as needed. Heat helps decrease pain and muscle spasms.    Please return to the emergency department if: You vomit blood or cannot stop vomiting. You have blood in your bowel movement or it looks like tar. You have bleeding from your rectum. Your abdomen is larger than usual, more painful, and hard. You have severe pain in your abdomen. You stop passing gas and having bowel movements. You feel weak, dizzy, or faint.  Secondary Diagnosis:	Leukocytosis  Assessment and plan of treatment:	You were found to have an elevated white blood cell count on admission. This may be seen in infection, but may also be seen in certain hematologic conditions. Please continue to follow up regularly with Dr. Steen (hematologist) to have regular blood counts to check your WBC.  Secondary Diagnosis:	Thrombocytopenia  Assessment and plan of treatment:	Please continue to follow up with Dr. Steen to have blood counts to check your platelet levels.  Secondary Diagnosis:	Atrial fibrillation  Assessment and plan of treatment:	You have atrial fibrillation and take coumadin regularly. Your INR in the hospital has been low. Please take 4 mg tonight on 5/14 and again tomorrow night on 5/15, then please continue to take your normal dose of 3 mg daily, but have your INR checked in 1-2 days by your own PCP so that the dose of coumadin can be adjusted, as needed.

## 2018-05-13 NOTE — CONSULT NOTE ADULT - ATTENDING COMMENTS
Patient has acute rise of monocytes and neutrophils.  This represents a stress reaction (most likely).  Her GI symptoms have resolved.  Cultures pending.    Would discharge home if cultures negative.    Would need repeat CBC and diff in 7-10 days to verify resolution.

## 2018-05-13 NOTE — H&P ADULT - PROBLEM SELECTOR PLAN 4
Chronic thrombocytopenia, followed by Dr. Jhon Steen as an outpatient.  Thought to be 2/2 possible MDS, monitoring CBC. Anticoagulation with coumadin  - check INR daily, dose coumadin

## 2018-05-13 NOTE — DISCHARGE NOTE ADULT - ADDITIONAL INSTRUCTIONS
Please make an appointment to see your hematologist Dr. Steen within 1-2 weeks of discharge to have your blood count checked, and to discuss the next steps of investigating your abnormal lab work. Please make an appointment to see your hematologist Dr. Steen within 1-2 weeks of discharge to have your blood count checked, and to discuss the next steps of investigating your abnormal lab work.    Your labs showed mild hyponatremia (low sodium), so you have been prescribed salt tablets. Please take one daily.

## 2018-05-13 NOTE — H&P ADULT - ASSESSMENT
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

## 2018-05-13 NOTE — DISCHARGE NOTE ADULT - MEDICATION SUMMARY - MEDICATIONS TO TAKE
I will START or STAY ON the medications listed below when I get home from the hospital:    warfarin 3 mg oral tablet  -- 1 tab(s) by mouth once a day  -- Indication: For Atrial fibrillation    amLODIPine 10 mg oral tablet  -- 1 tab(s) by mouth once a day  -- Indication: For Hypertension    famotidine 20 mg oral tablet  -- 1 tab(s) by mouth once a day  -- Indication: For Abdominal pain I will START or STAY ON the medications listed below when I get home from the hospital:    warfarin 3 mg oral tablet  -- 1 tab(s) by mouth once a day  -- Indication: For Atrial fibrillation    Coumadin 4 mg oral tablet  -- 1 tab(s) by mouth once a day on Monday 5/14 and Tuesday 5/15.   -- Do not take this drug if you are pregnant.  It is very important that you take or use this exactly as directed.  Do not skip doses or discontinue unless directed by your doctor.  Obtain medical advice before taking any non-prescription drugs as some may affect the action of this medication.    -- Indication: For Atrial fibrillation    amLODIPine 10 mg oral tablet  -- 1 tab(s) by mouth once a day  -- Indication: For Hypertension    famotidine 20 mg oral tablet  -- 1 tab(s) by mouth once a day  -- Indication: For Abdominal pain    Sodium Chloride 1 g oral tablet  -- 1 tab(s) by mouth once a day   -- Indication: For Hyponatremia

## 2018-05-13 NOTE — DISCHARGE NOTE ADULT - PLAN OF CARE
treat pain You were admitted to the hospital with abdominal pain. You had a CT abdomen/pelvis which did not show abnormalities that could be responsible for the pain. Your vitals and bloodwork did not suggest infection, so you were monitored without antibiotics. Your pain resolved with pepcid.     Please take tylenol 650 mg as needed for pain. Do not exceed 2800 mg in one day. Please make an appointment to see your PCP within one week of dischage. To manage symptoms, you may apply heat on your abdomen for 20 to 30 minutes every 2 hours for as many days as needed. Heat helps decrease pain and muscle spasms.    Please return to the emergency department if: You vomit blood or cannot stop vomiting. You have blood in your bowel movement or it looks like tar. You have bleeding from your rectum. Your abdomen is larger than usual, more painful, and hard. You have severe pain in your abdomen. You stop passing gas and having bowel movements. You feel weak, dizzy, or faint. You were found to have an elevated white blood cell count on admission. This may be seen in infection, but may also be seen in certain hematologic conditions. Please continue to follow up regularly with Dr. Steen (hematologist) to have regular blood counts to check your WBC. Please continue to follow up with Dr. Steen to have blood counts to check your platelet levels. You have atrial fibrillation and take coumadin regularly. Your INR in the hospital has been low. Please continue to take your normal dose of 3 mg daily, but have your INR checked in 1-2 days by your own PCP so that the dose of coumadin can be adjusted, as needed. You have atrial fibrillation and take coumadin regularly. Your INR in the hospital has been low. Please take 4 mg tonight on 5/14 and again tomorrow night on 5/15, then please continue to take your normal dose of 3 mg daily, but have your INR checked in 1-2 days by your own PCP so that the dose of coumadin can be adjusted, as needed.

## 2018-05-13 NOTE — H&P ADULT - PROBLEM SELECTOR PLAN 5
lakesha Chronic thrombocytopenia, followed by Dr. Jhon Steen as an outpatient.  Thought to be 2/2 possible MDS, monitoring CBC.

## 2018-05-13 NOTE — CONSULT NOTE ADULT - SUBJECTIVE AND OBJECTIVE BOX
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  Patient is Mohawk-speaking and history obtained from son.  Per son, patient has been having abdominal pain for the past 2 weeks, mainly central.  States that she has been burping a lot and he was treating her with OTC medications like mylanta and simethicone.  These agents provided relief for a week, however the pain that has been stable for the past week was worse today.  Denies any diarrhea, no sick contacts, no fevers or chills.  Has been having regular BMs, but last BM was 2 days ago.     Patient went to an outpatient clinic a few weeks ago and was given a prescription for Bactrim which she took for 2 weeks according to the son.    Also endorses poor PO intake over the past few months with weight loss, but cannot quantify.      In the ED, patient was afebrile, HR 80-90s, -160/70-80, RR 16, 96% room air.   A CT A/P was done which was negative for any acute pathology.  Given maalox and pepcid 20 IV. (13 May 2018 03:58)      Allergies    No Known Allergies    Intolerances        MEDICATIONS  (STANDING):  amLODIPine   Tablet 10 milliGRAM(s) Oral daily  famotidine    Tablet 20 milliGRAM(s) Oral daily  warfarin 3 milliGRAM(s) Oral once    MEDICATIONS  (PRN):      PAST MEDICAL & SURGICAL HISTORY:  Hypertension  Atrial fibrillation  H/O: hysterectomy      FAMILY HISTORY:      SOCIAL HISTORY: No EtOH, no tobacco    REVIEW OF SYSTEMS:    CONSTITUTIONAL: No weakness, fevers or chills  EYES/ENT: No visual changes;  No vertigo or throat pain   NECK: No pain or stiffness  RESPIRATORY: No cough, wheezing, hemoptysis; No shortness of breath  CARDIOVASCULAR: No chest pain or palpitations  GASTROINTESTINAL: No abdominal or epigastric pain. No nausea, vomiting, or hematemesis; No diarrhea or constipation. No melena or hematochezia.  GENITOURINARY: No dysuria, frequency or hematuria  NEUROLOGICAL: No numbness or weakness  SKIN: No itching, burning, rashes, or lesions   All other review of systems is negative unless indicated above.    Height (cm): 149.86 (05-13 @ 03:25)  Weight (kg): 44 (05-13 @ 03:25)  BMI (kg/m2): 19.6 (05-13 @ 03:25)  BSA (m2): 1.36 (05-13 @ 03:25)    T(F): 98.9 (05-13-18 @ 03:25), Max: 99.7 (05-13-18 @ 02:43)  HR: 91 (05-13-18 @ 06:30)  BP: 131/71 (05-13-18 @ 06:30)  RR: 18 (05-13-18 @ 03:25)  SpO2: 98% (05-13-18 @ 03:25)  Wt(kg): --    GENERAL: NAD, well-developed  HEAD:  Atraumatic, Normocephalic  EYES: EOMI, PERRLA, conjunctiva and sclera clear  NECK: Supple, No JVD  CHEST/LUNG: Clear to auscultation bilaterally; No wheeze  HEART: Regular rate and rhythm; No murmurs, rubs, or gallops  ABDOMEN: Soft, Nontender, Nondistended; Bowel sounds present  EXTREMITIES:  2+ Peripheral Pulses, No clubbing, cyanosis, or edema  NEUROLOGY: non-focal  SKIN: No rashes or lesions                          9.0    20.0  )-----------( 131      ( 13 May 2018 07:08 )             27.7       05-13    132<L>  |  94<L>  |  15  ----------------------------<  77  3.9   |  21<L>  |  0.90    Ca    8.8      13 May 2018 07:11  Phos  3.0     05-13  Mg     2.1     05-13    TPro  7.4  /  Alb  3.3  /  TBili  0.7  /  DBili  x   /  AST  12  /  ALT  13  /  AlkPhos  57  05-12      Magnesium, Serum: 2.1 mg/dL (05-13 @ 07:11)  Phosphorus Level, Serum: 3.0 mg/dL (05-13 @ 07:11) Hematology Consult Note    HPI:  89F w/ PMH of AFib (on coumadin), HTN, thrombocytopenia (stable, followed as an outpatient), AV block s/p PPM ([employer]), presenting from home with abdominal pain.  Patient is Irish-speaking and history obtained from son.  Per son, patient has been having abdominal pain for the past 2 weeks, mainly central.  States that she has been burping a lot and he was treating her with OTC medications like mylanta and simethicone.  These agents provided relief for a week, however the pain that has been stable for the past week was worse today.  Denies any diarrhea, no sick contacts, no fevers or chills.  Has been having regular BMs, but last BM was 2 days ago.     Patient went to an outpatient clinic and was diagnosed with UTI one week ago and put on Bactrim. She has been taking the bactrim since then and the abdominal discomfort started after taking bactrim. Her son believes she was given 2 week course of bactrim but he is not completely sure.   Also endorses poor PO intake over the past few months with weight loss, but cannot quantify.      In the ED, patient was afebrile, HR 80-90s, -160/70-80, RR 16, 96% room air.   A CT A/P was done which was negative for any acute pathology.  Given maalox and pepcid 20 IV. (13 May 2018 03:58)      Allergies    No Known Allergies    Intolerances        MEDICATIONS  (STANDING):  amLODIPine   Tablet 10 milliGRAM(s) Oral daily  famotidine    Tablet 20 milliGRAM(s) Oral daily  warfarin 3 milliGRAM(s) Oral once    MEDICATIONS  (PRN):      PAST MEDICAL & SURGICAL HISTORY:  Hypertension  Atrial fibrillation  H/O: hysterectomy      FAMILY HISTORY:      SOCIAL HISTORY: No EtOH, no tobacco    REVIEW OF SYSTEMS:    CONSTITUTIONAL: No weakness, fevers or chills  EYES/ENT: No visual changes;  No vertigo or throat pain   NECK: No pain or stiffness  RESPIRATORY: No cough, wheezing, hemoptysis; No shortness of breath  CARDIOVASCULAR: No chest pain or palpitations  GASTROINTESTINAL: No abdominal or epigastric pain. No nausea, vomiting, or hematemesis; No diarrhea or constipation. No melena or hematochezia.  GENITOURINARY: No dysuria, frequency or hematuria  NEUROLOGICAL: No numbness or weakness  SKIN: No itching, burning, rashes, or lesions   All other review of systems is negative unless indicated above.    Height (cm): 149.86 (05-13 @ 03:25)  Weight (kg): 44 (05-13 @ 03:25)  BMI (kg/m2): 19.6 (05-13 @ 03:25)  BSA (m2): 1.36 (05-13 @ 03:25)    T(F): 98.9 (05-13-18 @ 03:25), Max: 99.7 (05-13-18 @ 02:43)  HR: 91 (05-13-18 @ 06:30)  BP: 131/71 (05-13-18 @ 06:30)  RR: 18 (05-13-18 @ 03:25)  SpO2: 98% (05-13-18 @ 03:25)  Wt(kg): --    GENERAL: NAD, well-developed  HEAD:  Atraumatic, Normocephalic  EYES: EOMI, PERRLA, conjunctiva and sclera clear  NECK: Supple, No JVD  CHEST/LUNG: Clear to auscultation bilaterally; No wheeze  HEART: Regular rate and rhythm; No murmurs, rubs, or gallops  ABDOMEN: Soft, Nontender, Nondistended; Bowel sounds present  EXTREMITIES:  2+ Peripheral Pulses, No clubbing, cyanosis, or edema  NEUROLOGY: non-focal  SKIN: No rashes or lesions                          9.0    20.0  )-----------( 131      ( 13 May 2018 07:08 )             27.7       05-13    132<L>  |  94<L>  |  15  ----------------------------<  77  3.9   |  21<L>  |  0.90    Ca    8.8      13 May 2018 07:11  Phos  3.0     05-13  Mg     2.1     05-13    TPro  7.4  /  Alb  3.3  /  TBili  0.7  /  DBili  x   /  AST  12  /  ALT  13  /  AlkPhos  57  05-12      Magnesium, Serum: 2.1 mg/dL (05-13 @ 07:11)  Phosphorus Level, Serum: 3.0 mg/dL (05-13 @ 07:11)

## 2018-05-13 NOTE — PROGRESS NOTE ADULT - SUBJECTIVE AND OBJECTIVE BOX
Patient is a 89y old  Female who presents with a chief complaint of abdominal pain (13 May 2018 03:58)      SUBJECTIVE / OVERNIGHT EVENTS: No overnight events. No complaints this AM. Patient denies CP, SOB.  On tele:    MEDICATIONS  (STANDING):  amLODIPine   Tablet 10 milliGRAM(s) Oral daily  famotidine    Tablet 20 milliGRAM(s) Oral daily    MEDICATIONS  (PRN):    Vitals  T(C): 37.2 (05-13-18 @ 03:25), Max: 37.6 (05-13-18 @ 02:43)  HR: 91 (05-13-18 @ 06:30) (76 - 92)  BP: 131/71 (05-13-18 @ 06:30) (131/71 - 163/82)  RR: 18 (05-13-18 @ 03:25) (15 - 18)  SpO2: 98% (05-13-18 @ 03:25) (95% - 98%)    PHYSICAL EXAM  GENERAL: NAD, well-developed  NEURO: AO x3, PERRLA, EOMI, motor strength in tact in 4/4 extremities, sensation in tact  HEAD:  Atraumatic, Normocephalic  EYES: conjunctiva and sclera clear  NECK: Supple, No JVD, no lymphadenopathy, no thyromegaly  CHEST/LUNG: Clear to auscultation bilaterally; No wheezes, rales or rhonchi  HEART: Regular rate and rhythm; No murmurs, rubs, or gallops  ABDOMEN: Soft, Nontender, Nondistended; Bowel sounds present, no masses.  EXTREMITIES:  2+ Peripheral Pulses, No clubbing, cyanosis, or edema  SKIN: Warm, dry, in tact, no rashes or lesions  PSYCH: affect appropriate    LABS:                        9.0    20.0  )-----------( 131      ( 13 May 2018 07:08 )             27.7     05-13    132<L>  |  94<L>  |  15  ----------------------------<  77  3.9   |  21<L>  |  0.90    Ca    8.8      13 May 2018 07:11  Phos  3.0     05-13  Mg     2.1     05-13    TPro  7.4  /  Alb  3.3  /  TBili  0.7  /  DBili  x   /  AST  12  /  ALT  13  /  AlkPhos  57  05-12    PT/INR - ( 13 May 2018 07:07 )   PT: 14.9 sec;   INR: 1.36 ratio         PTT - ( 13 May 2018 07:07 )  PTT:31.9 sec  CARDIAC MARKERS ( 12 May 2018 20:13 )  x     / <0.01 ng/mL / x     / x     / x          Urinalysis Basic - ( 12 May 2018 23:27 )    Color: PL Yellow / Appearance: Clear / SG: >1.030 / pH: x  Gluc: x / Ketone: Negative  / Bili: Negative / Urobili: Negative   Blood: x / Protein: 100 mg/dL / Nitrite: Negative   Leuk Esterase: Negative / RBC: 5-10 /HPF / WBC 2-5 /HPF   Sq Epi: x / Non Sq Epi: Occasional /HPF / Bacteria: x      I&O's Summary    12 May 2018 07:01  -  13 May 2018 07:00  --------------------------------------------------------  IN: 120 mL / OUT: 0 mL / NET: 120 mL        Umberto Huerta MD  Internal Medicine PGY-1  Pager: [phone]/ [place]BAR[facility] [phone]  After 7 PM on weekdays and 12 PM on weekends page [phone] Patient is a 89y old  Female who presents with a chief complaint of abdominal pain (13 May 2018 03:58)      SUBJECTIVE / OVERNIGHT EVENTS: No overnight events. Denies abdominal pain, states pain is "0". No nausea/vomiting/diarrhea/constipation. Denies fevers/chills.     MEDICATIONS  (STANDING):  amLODIPine   Tablet 10 milliGRAM(s) Oral daily  famotidine    Tablet 20 milliGRAM(s) Oral daily    MEDICATIONS  (PRN):    Vitals  T(C): 37.2 (05-13-18 @ 03:25), Max: 37.6 (05-13-18 @ 02:43)  HR: 91 (05-13-18 @ 06:30) (76 - 92)  BP: 131/71 (05-13-18 @ 06:30) (131/71 - 163/82)  RR: 18 (05-13-18 @ 03:25) (15 - 18)  SpO2: 98% (05-13-18 @ 03:25) (95% - 98%)    PHYSICAL EXAM   GENERAL: Thin female, comfortable, lying in bed in no acute distress   	HEAD:  Normocephalic, atraumatic  	EYES: EOMI, PERRLA  	HEENT: Moist mucous membranes  	NECK: Supple, No JVD  	CHEST/LUNG: Clear to auscultation bilaterally, normal work of breathing  	HEART: Regular rate and rhythm, no murmur   	ABDOMEN: Soft, Nontender, Nondistended, Bowel sounds present  	EXTREMITIES:   No clubbing, cyanosis, or edema  	MUSCULOSKELETAL: No muscle tenderness, no joint tenderness  	SKIN: warm and dry, no rash   NERVOUS SYSTEM:  Alert & Oriented X3, conversant, follows commands, no focal deficits  LABS:                        9.0    20.0  )-----------( 131      ( 13 May 2018 07:08 )             27.7     05-13    132<L>  |  94<L>  |  15  ----------------------------<  77  3.9   |  21<L>  |  0.90    Ca    8.8      13 May 2018 07:11  Phos  3.0     05-13  Mg     2.1     05-13    TPro  7.4  /  Alb  3.3  /  TBili  0.7  /  DBili  x   /  AST  12  /  ALT  13  /  AlkPhos  57  05-12    PT/INR - ( 13 May 2018 07:07 )   PT: 14.9 sec;   INR: 1.36 ratio         PTT - ( 13 May 2018 07:07 )  PTT:31.9 sec  CARDIAC MARKERS ( 12 May 2018 20:13 )  x     / <0.01 ng/mL / x     / x     / x          Urinalysis Basic - ( 12 May 2018 23:27 )    Color: PL Yellow / Appearance: Clear / SG: >1.030 / pH: x  Gluc: x / Ketone: Negative  / Bili: Negative / Urobili: Negative   Blood: x / Protein: 100 mg/dL / Nitrite: Negative   Leuk Esterase: Negative / RBC: 5-10 /HPF / WBC 2-5 /HPF   Sq Epi: x / Non Sq Epi: Occasional /HPF / Bacteria: x      I&O's Summary    12 May 2018 07:01  -  13 May 2018 07:00  --------------------------------------------------------  IN: 120 mL / OUT: 0 mL / NET: 120 mL        Umberto Huerta MD  Internal Medicine PGY-1  Pager: [phone]/ [place] [phone]  After 7 PM on weekdays and 12 PM on weekends page [phone] Patient is a 89y old  Female who presents with a chief complaint of abdominal pain (13 May 2018 03:58)      SUBJECTIVE / OVERNIGHT EVENTS: No overnight events. Denies abdominal pain, states pain is "0". No nausea/vomiting/diarrhea/constipation. Denies fevers/chills, n/v.     MEDICATIONS  (STANDING):  amLODIPine   Tablet 10 milliGRAM(s) Oral daily  famotidine    Tablet 20 milliGRAM(s) Oral daily    MEDICATIONS  (PRN):    Vitals  T(C): 37.2 (05-13-18 @ 03:25), Max: 37.6 (05-13-18 @ 02:43)  HR: 91 (05-13-18 @ 06:30) (76 - 92)  BP: 131/71 (05-13-18 @ 06:30) (131/71 - 163/82)  RR: 18 (05-13-18 @ 03:25) (15 - 18)  SpO2: 98% (05-13-18 @ 03:25) (95% - 98%)    PHYSICAL EXAM   GENERAL: Thin female, comfortable, lying in bed in no acute distress   	HEAD:  Normocephalic, atraumatic  	EYES: EOMI, PERRLA  	HEENT: Moist mucous membranes  	NECK: Supple, No JVD  	CHEST/LUNG: Clear to auscultation bilaterally, normal work of breathing  	HEART: Regular rate and rhythm, no murmur   	ABDOMEN: Soft, Nontender, Nondistended, Bowel sounds present  	EXTREMITIES:   No clubbing, cyanosis, or edema  	MUSCULOSKELETAL: No muscle tenderness, no joint tenderness  	SKIN: warm and dry, no rash   NERVOUS SYSTEM:  Alert & Oriented X3, conversant, follows commands, no focal deficits  LABS:                        9.0    20.0  )-----------( 131      ( 13 May 2018 07:08 )             27.7     05-13    132<L>  |  94<L>  |  15  ----------------------------<  77  3.9   |  21<L>  |  0.90    Ca    8.8      13 May 2018 07:11  Phos  3.0     05-13  Mg     2.1     05-13    TPro  7.4  /  Alb  3.3  /  TBili  0.7  /  DBili  x   /  AST  12  /  ALT  13  /  AlkPhos  57  05-12    PT/INR - ( 13 May 2018 07:07 )   PT: 14.9 sec;   INR: 1.36 ratio         PTT - ( 13 May 2018 07:07 )  PTT:31.9 sec  CARDIAC MARKERS ( 12 May 2018 20:13 )  x     / <0.01 ng/mL / x     / x     / x          Urinalysis Basic - ( 12 May 2018 23:27 )    Color: PL Yellow / Appearance: Clear / SG: >1.030 / pH: x  Gluc: x / Ketone: Negative  / Bili: Negative / Urobili: Negative   Blood: x / Protein: 100 mg/dL / Nitrite: Negative   Leuk Esterase: Negative / RBC: 5-10 /HPF / WBC 2-5 /HPF   Sq Epi: x / Non Sq Epi: Occasional /HPF / Bacteria: x      I&O's Summary    12 May 2018 07:01  -  13 May 2018 07:00  --------------------------------------------------------  IN: 120 mL / OUT: 0 mL / NET: 120 mL        Umberto Huerta MD  Internal Medicine PGY-1  Pager: [phone]/ [place] [phone]  After 7 PM on weekdays and 12 PM on weekends page [phone]

## 2018-05-13 NOTE — DISCHARGE NOTE ADULT - CARE PROVIDER_API CALL
Jhon Steen), Hematology; Internal Medicine; [place] [address]  Phone: [phone]  Fax: [phone]

## 2018-05-13 NOTE — H&P ADULT - NSHPREVIEWOFSYSTEMS_GEN_ALL_CORE
CONSTITUTIONAL:  +WEIGHT LOSS, No fever, chills, weakness or fatigue.  HEENT:  Eyes:  No visual loss, blurred vision, double vision or yellow sclerae. Ears, Nose, Throat:  No hearing loss, sneezing, congestion, runny nose or sore throat.  SKIN:  No rash or itching.  CARDIOVASCULAR:  No chest pain, chest pressure or chest discomfort. No palpitations or edema.  RESPIRATORY:  No shortness of breath, cough or sputum.  GASTROINTESTINAL:  +ABDOMINAL PAIN, +ANOREXIA; No nausea, vomiting or diarrhea.   GENITOURINARY:  Denies hematuria, dysuria.   NEUROLOGICAL:  No headache, dizziness, syncope, paralysis, ataxia, numbness or tingling in the extremities. No change in bowel or bladder control.  MUSCULOSKELETAL:  No muscle, back pain, joint pain or stiffness.  HEMATOLOGIC:  No anemia, bleeding or bruising.  LYMPHATICS:  No enlarged nodes. No history of splenectomy.  PSYCHIATRIC:  No history of depression or anxiety.  ENDOCRINOLOGIC:  No reports of sweating, cold or heat intolerance. No polyuria or polydipsia.  ALLERGIES:  No history of asthma, hives, eczema or rhinitis.

## 2018-05-13 NOTE — CONSULT NOTE ADULT - ASSESSMENT
89F w/ PMH of AFib (on coumadin), HTN, chronic thrombocytopenia for past 7 years which has been stable, AV block s/p PPM ([employer]), presenting from home with abdominal pain.     #Monocytosis:  - acutely increased in setting of neutrophilia too accounting for total WBC of 20k  - monocytosis with neutrophilia is likely indicative of inflammatory state which may be related to patient's presenting complaint of gastritis  - recommend trending CBC with differential    #Thrombocytopenia, chronic:  - followed by Dr. Steen as an outpatient  - etiology unclear but may be 2/2 underlying MDS  - platelet count at 131 k, currently higher than baseline of ~ 70 k but not dropping 89F w/ PMH of AFib (on coumadin), HTN, chronic thrombocytopenia for past 7 years which has been stable, AV block s/p PPM ([employer]), presenting from home with abdominal pain.     #Monocytosis:  - acutely increased in setting of neutrophilia too accounting for total WBC of 20k  - monocytosis with neutrophilia is likely indicative of inflammatory state which may be related to patient's presenting complaint of gastritis  - recommend trending CBC with differential    #Thrombocytopenia, chronic:  - followed by Dr. Steen as an outpatient  - etiology unclear but may be 2/2 underlying MDS  - platelet count at 131 k, currently higher than baseline of ~ 70 k but not dropping    #Gastritis:  - patient and son say it started around the same she started bactrim. She has taken 1 week of bactrim already and UA is clear, so bactrim can likely be discontinued at this time.

## 2018-05-13 NOTE — PROGRESS NOTE ADULT - PROBLEM SELECTOR PLAN 1
Central abdominal pain for 2 weeks with worsening on day of admission, treated symptomatically with maalox and simethicone, likely 2/2 GERD vs. gastritis, however given WBC viral gastroenteritis is possible though less likely. WBC may also be explained by progression of MDS (see below). No sick contacts, diarrhea or vomiting to suggest infectious. Also has good appetite this AM.   CT A/P without any focal pathology  - serial abdominal exams  - encourage PO intake as tolerated  -c/w maalox, simethicone, pepcid PRN pain now resolved, admitted with Central abdominal pain for 2 weeks with worsening on day of admission, treated symptomatically with maalox and simethicone, likely 2/2 GERD vs. gastritis, however given WBC viral gastroenteritis is possible though less likely. WBC may also be explained by progression of MDS (see below). No sick contacts, diarrhea or vomiting to suggest infectious. Also has good appetite this AM.   CT A/P without any focal pathology  - encourage PO intake as tolerated  -c/w maalox, simethicone, pepcid PRN

## 2018-05-13 NOTE — H&P ADULT - ATTENDING COMMENTS
NIGHT HOSPITALIST:  Patient UNKNOWN to me previously, assigned to me at this point via the ER to admit this 89 y/o F--patient interviewed by examiner in patient's native Faroese--patient presently declined telephone  phone--patient with a history of atrial fibrillation on Coumadin, essential hypertension, unspecified thrombocytopenia, AV block with past PPM placement, essential HTN< with patient brought in by son apparently following a 2 week history of epigastric abdominal discomfort with associated unspecified weight loss.  Patient reports that she has been having a lot of gassy symptoms and her son was providing self Rx with Mylanta.  NO red blood per rectum or melena.  Unclear if patient has early satiety.  Reported outpatient treatment for a reported cystitis with Bactrim, albeit apparent extended 2 week course.  No abdominal pain at present.  No back pain, no tearing back pain.  No fever, no chills, no rigors.  No dysuria, no hematuria.  No cough.  No chest pain/pressure.  No dyspnea.  No HA< no focal weakness.   Remaining review of systems not contributory.  Lives with supportive son.  No tobacco or ethanol history. NIGHT HOSPITALIST:  Patient UNKNOWN to me previously, assigned to me at this point via the ER to admit this 89 y/o F--patient interviewed by examiner in patient's native Mexican--patient presently declined telephone  phone--patient with a history of atrial fibrillation on Coumadin, essential hypertension, unspecified thrombocytopenia, AV block with past PPM placement, essential HTN< with patient brought in by son apparently following a 2 week history of epigastric abdominal discomfort with associated unspecified weight loss.  Patient reports that she has been having a lot of gassy symptoms and her son was providing self Rx with Mylanta.  NO red blood per rectum or melena.  Unclear if patient has early satiety.  Reported outpatient treatment for a reported cystitis with Bactrim, albeit apparent extended 2 week course.  No abdominal pain at present.  No back pain, no tearing back pain.  No fever, no chills, no rigors.  No dysuria, no hematuria.  No cough.  No chest pain/pressure.  No dyspnea.  No HA< no focal weakness.   Remaining review of systems not contributory.  Lives with supportive son.  No tobacco or ethanol history.  Afebrile. HR  91  RR 14.  /71  98% on RA.  HEENT< PERRL< EOMI, bitemporal wasting.  Neck supple, chest with PPM c/d/i.  bony rib cage, clear lungs.  Declined breast exam.  Abdomen soft, scaphoid, normal bowel sounds, nontender, no rebound.  Ext with diffuse sarcopenia.  No oedema.  Neurologic exam AxOx3.  Speech fluent in native Mexican.  Cognition grossly intact.  Interactive with examiner with no need for prompting.  UE/LE 5/5.  WBC 21.2K  66% N, 3%L  31% M.  No BANDS.  Platelets of 136K.  Random glucose of 124.  Cr 0.9.  Alb 3.3.  K+ 4.1.  Troponin nil x 1.  U/A with 100 protein.  2 WBC but no leukocyte esterase.  CTT abdomen nondiagnostic.  Chest radiograph reviewed with no infiltrate or effusion. NIGHT HOSPITALIST:  Patient UNKNOWN to me previously, assigned to me at this point via the ER to admit this 89 y/o F--patient interviewed by examiner in patient's native Salvadorean--patient presently declined telephone  phone--patient with a history of atrial fibrillation on Coumadin, essential hypertension, unspecified thrombocytopenia, AV block with past PPM placement, essential HTN< with patient brought in by son apparently following a 2 week history of epigastric abdominal discomfort with associated unspecified weight loss.  Patient reports that she has been having a lot of gassy symptoms and her son was providing self Rx with Mylanta.  NO red blood per rectum or melena.  Unclear if patient has early satiety.  Reported outpatient treatment for a reported cystitis with Bactrim, albeit apparent extended 2 week course.  No abdominal pain at present.  No back pain, no tearing back pain.  No fever, no chills, no rigors.  No dysuria, no hematuria.  No cough.  No chest pain/pressure.  No dyspnea.  No HA< no focal weakness.   Remaining review of systems not contributory.  Lives with supportive son.  No tobacco or ethanol history.  Afebrile. HR  91  RR 14.  /71  98% on RA.  HEENT< PERRL< EOMI, bitemporal wasting.  Neck supple, chest with PPM c/d/i.  bony rib cage, clear lungs.  Declined breast exam.  Abdomen soft, scaphoid, normal bowel sounds, nontender, no rebound.  Ext with diffuse sarcopenia.  No oedema.  Neurologic exam AxOx3.  Speech fluent in native Salvadorean.  Cognition grossly intact.  Interactive with examiner with no need for prompting.  UE/LE 5/5.  WBC 21.2K  66% N, 3%L  31% M.  No BANDS.  Platelets of 136K.  Random glucose of 124.  Cr 0.9.  Alb 3.3.  K+ 4.1.  Troponin nil x 1.  U/A with 100 protein.  2 WBC but no leukocyte esterase.  CTT abdomen nondiagnostic.  Chest radiograph reviewed with no infiltrate or effusion.  Admitted to medicine.  Concerning episodes of abdominal pain with catabolic evidence of weight loss and severe hypoalbuminemia in this patient self medicating as above.  Needs to be seen by GI.  Would consider also evaluation by hematology>>patient with nonspecific leukocytosis with no clear evidence of infection.  Cultures sent as above.  Would check INR on Coumadin.  Patient/ son aware of risks/benefits of full AC for PAF and agree to continue as such.  Prognosis, given comorbidities, is guarded.  Emotional support provided to patient.  Patient/ son aware of course and agree with plan/care as above.  Care reviewed with Dr. Holden.  Care assumed by the DAY HOSPITALIST.    Dean Pham MD  [phone]

## 2018-05-14 VITALS — WEIGHT: 109.79 LBS

## 2018-05-14 LAB
ANION GAP SERPL CALC-SCNC: 16 MMOL/L — SIGNIFICANT CHANGE UP (ref 5–17)
APTT BLD: 32.6 SEC — SIGNIFICANT CHANGE UP (ref 27.5–37.4)
BASOPHILS # BLD AUTO: 0 K/UL — SIGNIFICANT CHANGE UP (ref 0–0.2)
BASOPHILS NFR BLD AUTO: 0 % — SIGNIFICANT CHANGE UP (ref 0–2)
BUN SERPL-MCNC: 17 MG/DL — SIGNIFICANT CHANGE UP (ref 7–23)
CALCIUM SERPL-MCNC: 8.4 MG/DL — SIGNIFICANT CHANGE UP (ref 8.4–10.5)
CHLORIDE SERPL-SCNC: 94 MMOL/L — LOW (ref 96–108)
CO2 SERPL-SCNC: 21 MMOL/L — LOW (ref 22–31)
CREAT SERPL-MCNC: 0.94 MG/DL — SIGNIFICANT CHANGE UP (ref 0.5–1.3)
EOSINOPHIL # BLD AUTO: 0 K/UL — SIGNIFICANT CHANGE UP (ref 0–0.5)
EOSINOPHIL NFR BLD AUTO: 0 % — SIGNIFICANT CHANGE UP (ref 0–6)
GLUCOSE SERPL-MCNC: 85 MG/DL — SIGNIFICANT CHANGE UP (ref 70–99)
HCT VFR BLD CALC: 27.2 % — LOW (ref 34.5–45)
HGB BLD-MCNC: 8.7 G/DL — LOW (ref 11.5–15.5)
INR BLD: 1.27 RATIO — HIGH (ref 0.88–1.16)
LYMPHOCYTES # BLD AUTO: 18 % — SIGNIFICANT CHANGE UP (ref 13–44)
LYMPHOCYTES # BLD AUTO: 2.32 K/UL — SIGNIFICANT CHANGE UP (ref 1–3.3)
MAGNESIUM SERPL-MCNC: 2.2 MG/DL — SIGNIFICANT CHANGE UP (ref 1.6–2.6)
MANUAL SMEAR VERIFICATION: SIGNIFICANT CHANGE UP
MCHC RBC-ENTMCNC: 26.6 PG — LOW (ref 27–34)
MCHC RBC-ENTMCNC: 32 GM/DL — SIGNIFICANT CHANGE UP (ref 32–36)
MCV RBC AUTO: 83.2 FL — SIGNIFICANT CHANGE UP (ref 80–100)
MONOCYTES # BLD AUTO: 2.57 K/UL — HIGH (ref 0–0.9)
MONOCYTES NFR BLD AUTO: 20 % — HIGH (ref 2–14)
NEUTROPHILS # BLD AUTO: 7.98 K/UL — HIGH (ref 1.8–7.4)
NEUTROPHILS NFR BLD AUTO: 62 % — SIGNIFICANT CHANGE UP (ref 43–77)
NRBC # BLD: 0 /100 — SIGNIFICANT CHANGE UP (ref 0–0)
PHOSPHATE SERPL-MCNC: 2.9 MG/DL — SIGNIFICANT CHANGE UP (ref 2.5–4.5)
PLAT MORPH BLD: NORMAL — SIGNIFICANT CHANGE UP
PLATELET # BLD AUTO: 147 K/UL — LOW (ref 150–400)
POTASSIUM SERPL-MCNC: 4 MMOL/L — SIGNIFICANT CHANGE UP (ref 3.5–5.3)
POTASSIUM SERPL-SCNC: 4 MMOL/L — SIGNIFICANT CHANGE UP (ref 3.5–5.3)
PROTHROM AB SERPL-ACNC: 14.4 SEC — HIGH (ref 10–13.1)
RBC # BLD: 3.27 M/UL — LOW (ref 3.8–5.2)
RBC # FLD: 19.6 % — HIGH (ref 10.3–14.5)
RBC BLD AUTO: SIGNIFICANT CHANGE UP
SODIUM SERPL-SCNC: 131 MMOL/L — LOW (ref 135–145)
WBC # BLD: 12.87 K/UL — HIGH (ref 3.8–10.5)
WBC # FLD AUTO: 12.87 K/UL — HIGH (ref 3.8–10.5)

## 2018-05-14 PROCEDURE — 99239 HOSP IP/OBS DSCHRG MGMT >30: CPT

## 2018-05-14 RX ORDER — SODIUM CHLORIDE 9 MG/ML
1 INJECTION INTRAMUSCULAR; INTRAVENOUS; SUBCUTANEOUS
Qty: 30 | Refills: 0
Start: 2018-05-14 | End: 2018-06-12

## 2018-05-14 RX ORDER — POLYETHYLENE GLYCOL 3350 17 G/17G
17 POWDER, FOR SOLUTION ORAL DAILY
Qty: 0 | Refills: 0 | Status: DISCONTINUED | OUTPATIENT
Start: 2018-05-14 | End: 2018-05-14

## 2018-05-14 RX ORDER — WARFARIN SODIUM 2.5 MG/1
1 TABLET ORAL
Qty: 2 | Refills: 0
Start: 2018-05-14 | End: 2018-05-15

## 2018-05-14 RX ORDER — DOCUSATE SODIUM 100 MG
100 CAPSULE ORAL DAILY
Qty: 0 | Refills: 0 | Status: DISCONTINUED | OUTPATIENT
Start: 2018-05-14 | End: 2018-05-14

## 2018-05-14 RX ADMIN — FAMOTIDINE 20 MILLIGRAM(S): 10 INJECTION INTRAVENOUS at 11:42

## 2018-05-14 RX ADMIN — AMLODIPINE BESYLATE 10 MILLIGRAM(S): 2.5 TABLET ORAL at 05:57

## 2018-05-14 RX ADMIN — POLYETHYLENE GLYCOL 3350 17 GRAM(S): 17 POWDER, FOR SOLUTION ORAL at 05:59

## 2018-05-14 RX ADMIN — Medication 100 MILLIGRAM(S): at 05:57

## 2018-05-14 NOTE — PROGRESS NOTE ADULT - ATTENDING COMMENTS
Patient seen and examined by me. d/w patient's son at bed side. No abdominal pain. afebrile no events. patient is very anxious to ho home today. abdominal exam is benign , NT/ND  INR noted is 1.27 ( was off for few days and started I day prior to admission. ) 4 mg today, 4 tomorrow at INR check on Wednesday. Mild hyponatremia. - salt tab for 2 days and recheck BMP on Monday.   Hospital course was discussed with patient PMD Dr Salas  [phone].  bowel regimen   d/c planning  d/w patient's son  discharge time 35 minutes
seen/examined.  Reports abd pain has now resolved, no f/c/n/v/d  ct abd/pelvis with no acute findings, UA negative for infection and no dysuria  wbc still 20 with 30% monocytes   discussed with juan francisco Steele,  monocytosis with neutrophilia is likely indicative of inflammatory state which may be related to presenting GI symptoms  f/u blood cultures, vitals  repeat cbc in 7-10 days to ensure resolution    Afib: INR subtherapeutic, c/w coumadin dosing, monitor INR    hyponatremia: stable, likely due to low solute intake, anorexia, encourage po hydration    Dispo:  if blood cultures negative consider discharge home buck

## 2018-05-14 NOTE — PROGRESS NOTE ADULT - PROBLEM SELECTOR PLAN 4
Anticoagulation with coumadin  - check INR daily, dose coumadin tonight
Anticoagulation with coumadin  - check INR daily, dose coumadin tonight

## 2018-05-14 NOTE — DIETITIAN INITIAL EVALUATION ADULT. - ETIOLOGY
altered GI function related to abdominal pain altered GI function related to abdominal pain, loss of taste

## 2018-05-14 NOTE — DIETITIAN INITIAL EVALUATION ADULT. - PROBLEM SELECTOR PLAN 2
Leukocytosis to 21, no associated fever or tachycardia.  No antibiotics given in the ED and will hold for now as no clear source.  Outpatient UTI treated with bactrim for >10 days and CT A/P negative for acute pathology.  - trend WBC  - f/u blood cultures x 2  - Consider Heme consult for further work-up given monocytosis and Dr. Steen's outpatient notes suggesting possible MDS

## 2018-05-14 NOTE — DIETITIAN INITIAL EVALUATION ADULT. - OTHER INFO
nutrition consult for catabolic state. Patient alert and oriented, lives wih her son who is at bedside. Per son patient has decreased PO intake with weight loss for 1 year. Itayt likes sweets, likes to add salt to her food. Discussed ensure supplements, and benefit of alternative spices/flavorings beside salt.  Pt 's son interest in PO supplements to help improved intake and stop weight loss.

## 2018-05-14 NOTE — PHYSICAL THERAPY INITIAL EVALUATION ADULT - PLANNED THERAPY INTERVENTIONS, PT EVAL
bed mobility training/gait training/balance training/strengthening/transfer training/Stair Training Goal: Pt will asc/desc 3 steps x independent with 1 rail in 2 weeks.

## 2018-05-14 NOTE — PHYSICAL THERAPY INITIAL EVALUATION ADULT - ADDITIONAL COMMENTS
Pt lives with son in [facility], 2 FLASH + rail. PTA, ambulates with cane or no AD. Owns shower chair (no other DME). Pt lives with son in [facility], 2 FLASH + rail. PTA, ambulates with cane or no AD. Owns shower chair (no other DME). M/W/F pt goes to an adult care center during the day where she is assisted with ADLs. T/R pt has HHA at home for the same. Weekends, son assists pt with all necessary care.

## 2018-05-14 NOTE — PROVIDER CONTACT NOTE (OTHER) - ASSESSMENT
abdomen soft and non-tender, encouraged po intake, bladder scan post void showed 15ml
patient resting comfortably in bed; last reported bowel movemen: 5/11
Yes

## 2018-05-14 NOTE — PHYSICAL THERAPY INITIAL EVALUATION ADULT - PERTINENT HX OF CURRENT PROBLEM, REHAB EVAL
89y F admitted 5/13 with abdominal pain and leukocytosis. CT A/P (-). Likely viral gastroenteritis vs. gastritis vs. GERD.

## 2018-05-14 NOTE — CHART NOTE - NSCHARTNOTEFT_GEN_A_CORE
Evaluated patient at bedside and discussed with son, Messi Foster. He reports that the patient's abdominal pain is resolved but he is still concerned about her burping. Given her recent decrease in PO intake and this, I recommended that she follow up with a gastroenterologist as an outpatient upon discharge. He expressed understanding of this. Discharge plan of care discussed and all questions were answered.    Rishabh Davis, PGY-2  [phone]

## 2018-05-14 NOTE — PROGRESS NOTE ADULT - ASSESSMENT
89F w/ PMH of AFib (on coumadin), HTN, thrombocytopenia (stable, followed as an outpatient), AV block s/p PPM (Medtronic), presenting from home with abdominal pain, also with monocytosis and thrombocytopenia (stable), abdominal pain now resolved s/p pepcid + maalox, imaging negative for colitis/infection, WBC normalized.
89F w/ PMH of AFib (on coumadin), HTN, thrombocytopenia (stable, followed as an outpatient), AV block s/p PPM ([employer]tronic), presenting from home with abdominal pain, also with monocytosis and thrombocytopenia (stable).

## 2018-05-14 NOTE — PROGRESS NOTE ADULT - SUBJECTIVE AND OBJECTIVE BOX
Patient is a 89y old  Female who presents with a chief complaint of abdominal pain (13 May 2018 17:06)    SUBJECTIVE / OVERNIGHT EVENTS: No overnight events. No complaints this AM. Patient denies CP, SOB. Wants a hot cup of coffee and to go home. Denies abdominal pain, N/V/D. No F/C.     MEDICATIONS  (STANDING):  amLODIPine   Tablet 10 milliGRAM(s) Oral daily  famotidine    Tablet 20 milliGRAM(s) Oral daily    MEDICATIONS  (PRN):  acetaminophen    Suspension. 650 milliGRAM(s) Oral every 6 hours PRN Mild Pain (1 - 3)  aluminum hydroxide/magnesium hydroxide/simethicone Suspension 30 milliLiter(s) Oral every 4 hours PRN Dyspepsia  docusate sodium 100 milliGRAM(s) Oral daily PRN Constipation  polyethylene glycol 3350 17 Gram(s) Oral daily PRN Constipation  simethicone 80 milliGRAM(s) Chew every 8 hours PRN Gas    Vitals  T(C): 36.7 (05-14-18 @ 05:54), Max: 37.6 (05-13-18 @ 19:45)  HR: 81 (05-14-18 @ 05:54) (81 - 94)  BP: 113/63 (05-14-18 @ 05:54) (100/60 - 129/64)  RR: 18 (05-14-18 @ 05:54) (18 - 18)  SpO2: 96% (05-14-18 @ 05:54) (96% - 96%)    PHYSICAL EXAM   GENERAL: Thin elderly female, comfortable, lying in bed in no acute distress   	HEAD:  Normocephalic, atraumatic  	EYES: EOMI, PERRLA  	HEENT: Moist mucous membranes  	NECK: Supple, No JVD  	CHEST/LUNG: Clear to auscultation bilaterally, normal work of breathing  	HEART: Regular rate and rhythm, +systolic murmur, heard loudest 2nd LSB.   	ABDOMEN: Soft, Nontender, Nondistended, Bowel sounds present  	EXTREMITIES:   No clubbing, cyanosis, or edema  	MUSCULOSKELETAL: No muscle tenderness, no joint tenderness  	SKIN: warm and dry, no rash   NERVOUS SYSTEM:  Alert & Oriented X3, conversant, follows commands, no focal deficits  LABS:                        8.7    12.87 )-----------( 147      ( 14 May 2018 07:39 )             27.2     05-14    131<L>  |  94<L>  |  17  ----------------------------<  85  4.0   |  21<L>  |  0.94    Ca    8.4      14 May 2018 06:47  Phos  2.9     05-14  Mg     2.2     05-14    TPro  7.4  /  Alb  3.3  /  TBili  0.7  /  DBili  x   /  AST  12  /  ALT  13  /  AlkPhos  57  05-12    PT/INR - ( 13 May 2018 07:07 )   PT: 14.9 sec;   INR: 1.36 ratio         PTT - ( 13 May 2018 07:07 )  PTT:31.9 sec  CARDIAC MARKERS ( 12 May 2018 20:13 )  x     / <0.01 ng/mL / x     / x     / x          Urinalysis Basic - ( 12 May 2018 23:27 )    Color: PL Yellow / Appearance: Clear / SG: >1.030 / pH: x  Gluc: x / Ketone: Negative  / Bili: Negative / Urobili: Negative   Blood: x / Protein: 100 mg/dL / Nitrite: Negative   Leuk Esterase: Negative / RBC: 5-10 /HPF / WBC 2-5 /HPF   Sq Epi: x / Non Sq Epi: Occasional /HPF / Bacteria: x      I&O's Summary    13 May 2018 07:01  -  14 May 2018 07:00  --------------------------------------------------------  IN: 320 mL / OUT: 100 mL / NET: 220 mL    Umberto Huerta MD  Internal Medicine PGY-1  Pager: [phone]/ BARBAR[facility] [phone]  After 7 PM on weekdays and 12 PM on weekends page [phone]

## 2018-05-14 NOTE — PROGRESS NOTE ADULT - PROBLEM SELECTOR PLAN 5
Mild, chronic thrombocytopenia, followed by Dr. Jhon Steen as an outpatient.  Thought to be 2/2 possible MDS, monitoring CBC.
Mild, chronic thrombocytopenia, followed by Dr. Jhon Steen as an outpatient.  Thought to be 2/2 possible MDS, monitoring CBC.

## 2018-05-14 NOTE — PHYSICAL THERAPY INITIAL EVALUATION ADULT - STRENGTHENING, PT EVAL
Goal: Pt's BLE strength will improve by at least 1/2 MMT grade, increasing safety and independence with functional mobility in 2 weeks.

## 2018-05-14 NOTE — DIETITIAN INITIAL EVALUATION ADULT. - NUTRITION INTERVENTION
Medical Food Supplements/Meals and Snack Medical Food Supplements/Feeding Assistance/Meals and Snack

## 2018-05-14 NOTE — DIETITIAN INITIAL EVALUATION ADULT. - ENERGY NEEDS
Chart review:  89F w/ PMH of AFib (on coumadin), HTN, thrombocytopenia (stable, followed as an outpatient), AV block s/p PPM ([employer]tr[employer]), presenting from home with abdominal pain, also with monocytosis and thrombocytopenia (stable).     HT  4'11"   WT  96  lbs   BMI=19

## 2018-05-14 NOTE — PROVIDER CONTACT NOTE (OTHER) - SITUATION
last bowel movement date: 5/11/18; no bowel movement mentioned in stool count. patient does not have orders for laxatives/stool softeners

## 2018-05-14 NOTE — PHYSICAL THERAPY INITIAL EVALUATION ADULT - BALANCE TRAINING, PT EVAL
Goal: Pt's static and dynamic standing balance will improve, decreasing fall risk and increasing independence with functional mobility in 2 weeks.

## 2018-05-14 NOTE — PROGRESS NOTE ADULT - PROBLEM SELECTOR PLAN 2
-resolved  -not septic  -blood cx NGTD  -heme consult appreciated, suggested outpatient CBC's with Dr. Steen
Leukocytosis to 21, no associated fever or tachycardia  -does not meet sepsis criteria, do not need to treat with antibiotics at this time.   - trend WBC  - f/u blood cultures x 2  -will call heme consult today for further work-up given monocytosis and Dr. Steen's outpatient notes suggesting possible MDS

## 2018-05-14 NOTE — DIETITIAN INITIAL EVALUATION ADULT. - PROBLEM SELECTOR PLAN 5
Chronic thrombocytopenia, followed by Dr. Jhon Steen as an outpatient.  Thought to be 2/2 possible MDS, monitoring CBC.

## 2018-05-14 NOTE — PROGRESS NOTE ADULT - PROBLEM SELECTOR PLAN 1
Pain now resolved, admitted with central abdominal pain for 2 weeks with worsening on day of admission, treated symptomatically with maalox and simethicone, likely 2/2 GERD vs. gastritis.  -WBC now normalized  -good appetite, no focal exam signs, no abdominal pain today.   -c/w maalox, simethicone, pepcid PRN at home

## 2018-05-18 LAB
CULTURE RESULTS: SIGNIFICANT CHANGE UP
CULTURE RESULTS: SIGNIFICANT CHANGE UP
SPECIMEN SOURCE: SIGNIFICANT CHANGE UP
SPECIMEN SOURCE: SIGNIFICANT CHANGE UP

## 2018-05-23 PROCEDURE — 82435 ASSAY OF BLOOD CHLORIDE: CPT

## 2018-05-23 PROCEDURE — 80048 BASIC METABOLIC PNL TOTAL CA: CPT

## 2018-05-23 PROCEDURE — 85730 THROMBOPLASTIN TIME PARTIAL: CPT

## 2018-05-23 PROCEDURE — 82803 BLOOD GASES ANY COMBINATION: CPT

## 2018-05-23 PROCEDURE — 85610 PROTHROMBIN TIME: CPT

## 2018-05-23 PROCEDURE — 96374 THER/PROPH/DIAG INJ IV PUSH: CPT | Mod: XU

## 2018-05-23 PROCEDURE — 84295 ASSAY OF SERUM SODIUM: CPT

## 2018-05-23 PROCEDURE — 83605 ASSAY OF LACTIC ACID: CPT

## 2018-05-23 PROCEDURE — 85027 COMPLETE CBC AUTOMATED: CPT

## 2018-05-23 PROCEDURE — 82947 ASSAY GLUCOSE BLOOD QUANT: CPT

## 2018-05-23 PROCEDURE — 84156 ASSAY OF PROTEIN URINE: CPT

## 2018-05-23 PROCEDURE — 97161 PT EVAL LOW COMPLEX 20 MIN: CPT

## 2018-05-23 PROCEDURE — 93005 ELECTROCARDIOGRAM TRACING: CPT

## 2018-05-23 PROCEDURE — 84484 ASSAY OF TROPONIN QUANT: CPT

## 2018-05-23 PROCEDURE — 82330 ASSAY OF CALCIUM: CPT

## 2018-05-23 PROCEDURE — 84100 ASSAY OF PHOSPHORUS: CPT

## 2018-05-23 PROCEDURE — 80053 COMPREHEN METABOLIC PANEL: CPT

## 2018-05-23 PROCEDURE — 74177 CT ABD & PELVIS W/CONTRAST: CPT

## 2018-05-23 PROCEDURE — 87040 BLOOD CULTURE FOR BACTERIA: CPT

## 2018-05-23 PROCEDURE — 99285 EMERGENCY DEPT VISIT HI MDM: CPT | Mod: 25

## 2018-05-23 PROCEDURE — 83735 ASSAY OF MAGNESIUM: CPT

## 2018-05-23 PROCEDURE — 81001 URINALYSIS AUTO W/SCOPE: CPT

## 2018-05-23 PROCEDURE — 71045 X-RAY EXAM CHEST 1 VIEW: CPT

## 2018-05-23 PROCEDURE — 85014 HEMATOCRIT: CPT

## 2018-05-23 PROCEDURE — 83690 ASSAY OF LIPASE: CPT

## 2018-05-23 PROCEDURE — 84132 ASSAY OF SERUM POTASSIUM: CPT

## 2018-05-29 ENCOUNTER — NON-APPOINTMENT (OUTPATIENT)
Age: 83
End: 2018-05-29

## 2018-05-29 ENCOUNTER — APPOINTMENT (OUTPATIENT)
Dept: ELECTROPHYSIOLOGY | Facility: CLINIC | Age: 83
End: 2018-05-29
Payer: COMMERCIAL

## 2018-05-29 ENCOUNTER — APPOINTMENT (OUTPATIENT)
Dept: CARDIOLOGY | Facility: CLINIC | Age: 83
End: 2018-05-29
Payer: COMMERCIAL

## 2018-05-29 ENCOUNTER — APPOINTMENT (OUTPATIENT)
Dept: ELECTROPHYSIOLOGY | Facility: CLINIC | Age: 83
End: 2018-05-29

## 2018-05-29 VITALS
HEART RATE: 108 BPM | BODY MASS INDEX: 25.51 KG/M2 | SYSTOLIC BLOOD PRESSURE: 156 MMHG | WEIGHT: 101 LBS | DIASTOLIC BLOOD PRESSURE: 69 MMHG | OXYGEN SATURATION: 96 %

## 2018-05-29 PROCEDURE — 99215 OFFICE O/P EST HI 40 MIN: CPT

## 2018-05-29 PROCEDURE — 93280 PM DEVICE PROGR EVAL DUAL: CPT

## 2018-06-06 ENCOUNTER — APPOINTMENT (OUTPATIENT)
Dept: ORTHOPEDIC SURGERY | Facility: CLINIC | Age: 83
End: 2018-06-06
Payer: COMMERCIAL

## 2018-06-06 VITALS
HEART RATE: 91 BPM | BODY MASS INDEX: 25.46 KG/M2 | DIASTOLIC BLOOD PRESSURE: 76 MMHG | WEIGHT: 110 LBS | HEIGHT: 55 IN | SYSTOLIC BLOOD PRESSURE: 159 MMHG

## 2018-06-06 DIAGNOSIS — M19.011 PRIMARY OSTEOARTHRITIS, RIGHT SHOULDER: ICD-10-CM

## 2018-06-06 PROCEDURE — 20610 DRAIN/INJ JOINT/BURSA W/O US: CPT | Mod: RT

## 2018-06-06 PROCEDURE — 99204 OFFICE O/P NEW MOD 45 MIN: CPT | Mod: 25

## 2018-06-08 ENCOUNTER — OUTPATIENT (OUTPATIENT)
Dept: OUTPATIENT SERVICES | Facility: HOSPITAL | Age: 83
LOS: 1 days | Discharge: HOME | End: 2018-06-08

## 2018-06-08 DIAGNOSIS — D69.6 THROMBOCYTOPENIA, UNSPECIFIED: ICD-10-CM

## 2018-06-08 DIAGNOSIS — Z90.710 ACQUIRED ABSENCE OF BOTH CERVIX AND UTERUS: Chronic | ICD-10-CM

## 2018-06-10 ENCOUNTER — EMERGENCY (EMERGENCY)
Facility: HOSPITAL | Age: 83
LOS: 1 days | Discharge: HOME | End: 2018-06-10
Attending: EMERGENCY MEDICINE
Payer: MEDICARE

## 2018-06-10 VITALS
OXYGEN SATURATION: 99 % | HEART RATE: 89 BPM | RESPIRATION RATE: 16 BRPM | SYSTOLIC BLOOD PRESSURE: 137 MMHG | DIASTOLIC BLOOD PRESSURE: 74 MMHG

## 2018-06-10 VITALS
DIASTOLIC BLOOD PRESSURE: 94 MMHG | HEART RATE: 110 BPM | OXYGEN SATURATION: 99 % | RESPIRATION RATE: 18 BRPM | SYSTOLIC BLOOD PRESSURE: 160 MMHG

## 2018-06-10 DIAGNOSIS — R04.0 EPISTAXIS: ICD-10-CM

## 2018-06-10 DIAGNOSIS — Z90.710 ACQUIRED ABSENCE OF BOTH CERVIX AND UTERUS: Chronic | ICD-10-CM

## 2018-06-10 LAB
ALBUMIN SERPL ELPH-MCNC: 3.5 G/DL — SIGNIFICANT CHANGE UP (ref 3.3–5)
ALP SERPL-CCNC: 80 U/L — SIGNIFICANT CHANGE UP (ref 40–120)
ALT FLD-CCNC: 18 U/L — SIGNIFICANT CHANGE UP (ref 10–45)
ANION GAP SERPL CALC-SCNC: 14 MMOL/L — SIGNIFICANT CHANGE UP (ref 5–17)
APTT BLD: 30.8 SEC — SIGNIFICANT CHANGE UP (ref 27.5–37.4)
AST SERPL-CCNC: 22 U/L — SIGNIFICANT CHANGE UP (ref 10–40)
BILIRUB SERPL-MCNC: 0.4 MG/DL — SIGNIFICANT CHANGE UP (ref 0.2–1.2)
BUN SERPL-MCNC: 26 MG/DL — HIGH (ref 7–23)
CALCIUM SERPL-MCNC: 9.1 MG/DL — SIGNIFICANT CHANGE UP (ref 8.4–10.5)
CHLORIDE SERPL-SCNC: 104 MMOL/L — SIGNIFICANT CHANGE UP (ref 96–108)
CO2 SERPL-SCNC: 24 MMOL/L — SIGNIFICANT CHANGE UP (ref 22–31)
CREAT SERPL-MCNC: 0.83 MG/DL — SIGNIFICANT CHANGE UP (ref 0.5–1.3)
GLUCOSE SERPL-MCNC: 140 MG/DL — HIGH (ref 70–99)
HCT VFR BLD CALC: 31.7 % — LOW (ref 34.5–45)
HGB BLD-MCNC: 10.1 G/DL — LOW (ref 11.5–15.5)
INR BLD: 1.49 RATIO — HIGH (ref 0.88–1.16)
MCHC RBC-ENTMCNC: 27.3 PG — SIGNIFICANT CHANGE UP (ref 27–34)
MCHC RBC-ENTMCNC: 31.8 GM/DL — LOW (ref 32–36)
MCV RBC AUTO: 85.6 FL — SIGNIFICANT CHANGE UP (ref 80–100)
PLATELET # BLD AUTO: 119 K/UL — LOW (ref 150–400)
POTASSIUM SERPL-MCNC: 3.5 MMOL/L — SIGNIFICANT CHANGE UP (ref 3.5–5.3)
POTASSIUM SERPL-SCNC: 3.5 MMOL/L — SIGNIFICANT CHANGE UP (ref 3.5–5.3)
PROT SERPL-MCNC: 8.2 G/DL — SIGNIFICANT CHANGE UP (ref 6–8.3)
PROTHROM AB SERPL-ACNC: 16.4 SEC — HIGH (ref 9.8–12.7)
RBC # BLD: 3.7 M/UL — LOW (ref 3.8–5.2)
RBC # FLD: 18.1 % — HIGH (ref 10.3–14.5)
SODIUM SERPL-SCNC: 142 MMOL/L — SIGNIFICANT CHANGE UP (ref 135–145)
WBC # BLD: 7.2 K/UL — SIGNIFICANT CHANGE UP (ref 3.8–10.5)
WBC # FLD AUTO: 7.2 K/UL — SIGNIFICANT CHANGE UP (ref 3.8–10.5)

## 2018-06-10 PROCEDURE — 93010 ELECTROCARDIOGRAM REPORT: CPT

## 2018-06-10 PROCEDURE — 85730 THROMBOPLASTIN TIME PARTIAL: CPT

## 2018-06-10 PROCEDURE — 30903 CONTROL OF NOSEBLEED: CPT | Mod: LT

## 2018-06-10 PROCEDURE — 85027 COMPLETE CBC AUTOMATED: CPT

## 2018-06-10 PROCEDURE — 93005 ELECTROCARDIOGRAM TRACING: CPT | Mod: XU

## 2018-06-10 PROCEDURE — 80053 COMPREHEN METABOLIC PANEL: CPT

## 2018-06-10 PROCEDURE — 99284 EMERGENCY DEPT VISIT MOD MDM: CPT | Mod: 25

## 2018-06-10 PROCEDURE — 85610 PROTHROMBIN TIME: CPT

## 2018-06-10 NOTE — ED ADULT NURSE NOTE - OBJECTIVE STATEMENT
pt had sudden onset of resolving nosebleed pt on coumadin and levels wher high pt hqas held coumadin for 2 days and as per son supposed to restart tonight.  BIBA with ivl in the left arm patent bloods sent as ordered Gamal

## 2018-06-10 NOTE — CONSULT NOTE ADULT - PROBLEM SELECTOR RECOMMENDATION 9
Nasal Packing to remain x 3 days  Start ABX ( keflex) for packing prophylaxis  Nasal Saline B/L 3x/day, Bacitracin OIntment B/L 2x/day  F/U ENT clinic for packing removal [phone]  Avoid Nasal Trauma/cough/sneeze with mouth open/no straining sleep with HOB elevated  Tylenol OTC as directed for pain  Optimize Anticoagulation as possible

## 2018-06-10 NOTE — CONSULT NOTE ADULT - SUBJECTIVE AND OBJECTIVE BOX
CC: Left Epistaxis    HPI: 89 yo F with PMHx of Atrial fibrillation s/p PPM, HTN (on Warfarin), Thrombopenia p/w episode of epistaxis that lasted for 45 minutes PTA. Epistaxis stopped with pressure approximately 20 minutes ago. Pt is currently on chronic anticoagulation, Warfarin. Modifying factors. Fevers/chills. Dysphagia/odynophagia/hemoptysis/unintentional weight loss. Any other relevant history/symptoms.    PAST MEDICAL & SURGICAL HISTORY:  Hypertension  Atrial fibrillation  H/O: hysterectomy    Allergies    No Known Allergies    Intolerances      MEDICATIONS  (STANDING):    MEDICATIONS  (PRN):    Social History: ????    ROS: ENT, GI, , CV, Pulm, Neuro, Psych, MS, Heme, Endo, Constitional; all negative except as noted in HPI    Vital Signs Last 24 Hrs  T(C): 36.9 (10 Chay 2018 17:08), Max: 36.9 (10 Chay 2018 17:08)  T(F): 98.5 (10 Chay 2018 17:08), Max: 98.5 (10 Chay 2018 17:08)  HR: 109 (10 Chay 2018 17:08) (109 - 110)  BP: 145/90 (10 Chay 2018 17:08) (145/90 - 160/94)  BP(mean): --  RR: 18 (10 Chay 2018 17:08) (18 - 18)  SpO2: 99% (10 Chay 2018 17:08) (99% - 99%)                          10.1   7.2   )-----------( 119      ( 10 Chay 2018 17:18 )             31.7    06-10    142  |  104  |  26<H>  ----------------------------<  140<H>  3.5   |  24  |  0.83    Ca    9.1      10 Chay 2018 17:18    TPro  8.2  /  Alb  3.5  /  TBili  0.4  /  DBili  x   /  AST  22  /  ALT  18  /  AlkPhos  80  06-10   PT/INR - ( 10 Chay 2018 17:18 )   PT: 16.4 sec;   INR: 1.49 ratio         PTT - ( 10 Chay 2018 17:18 )  PTT:30.8 sec    PHYSICAL EXAM:  Gen: NAD, well-developed  Head: Normocephalic, Atraumatic  Face: no edema/erythema/fluctuance, parotid glands soft without mass  Eyes: no scleral injection  Nose: Left Nare with bleeding and oozing, large Clot removed from Left Nostril,  Cauterized with Agno3, pt continually forcefully coughing-rebled, Left Anterior Rapid Rhino Placed, inflated with 3ml of NS, with pressure dressing over nose, with control of bleeding  Mouth: Mucosa moist, tongue/uvula midline, oropharynx with blood in posterior OP  Neck: Flat, supple, no lymphadenopathy, trachea midline, no masses  Resp: breathing easily, no stridor

## 2018-06-10 NOTE — ED PROVIDER NOTE - PHYSICAL EXAMINATION
elderly woman looking state of age with dried blood from nares, occasionally spitting blood from mouth elderly woman looking state of age with dried blood from nares, occasionally spitting blood from mouth. Posteriori pharynx with no obvious bleeding.

## 2018-06-10 NOTE — ED PROVIDER NOTE - NOSE FINDINGS
CLOTTED NASAL BLOOD/dried blood from nares, occasionally spitting blood from mouth CLOTTED NASAL BLOOD/dried blood from nares, occasionally spitting blood from mouth. Posteriori pharynx with no obvious bleeding.

## 2018-06-10 NOTE — ED PROVIDER NOTE - PROGRESS NOTE DETAILS
ED Sign out, controlled epistaxis by ENT, d/c w/ close outpt fu -- Elkin Foster, MD resolved epistaxis, tolerating PO, nml VS, HD stable, walking in ED w/o distress, w/ son (easily translating), in depth dw all about ddx, tx, jim, continued close outpt and ENT fu.  - Elkin Kim MD

## 2018-06-10 NOTE — ED PROVIDER NOTE - NS_ ATTENDINGSCRIBEDETAILS _ED_A_ED_FT
History and Physical performed by me with scribe under my direct supervision.  ALICIA Mauricio MD FACEP

## 2018-06-10 NOTE — ED PROVIDER NOTE - OBJECTIVE STATEMENT
89 yo F with PMHx of Atrial fibrillation s/p PPM, HTN (on Warfarin), Thrombopenia p/w episode of epistaxis that lasted for 45 minutes PTA. Epistaxis stopped with pressure approximately 20 minutes ago. Pt is currently on chronic anticoagulation, Warfarin. Denies trauma, abdominal pain, N/V, F/C, CP or any other acute complaints.

## 2018-06-11 PROBLEM — D69.6 THROMBOCYTOPENIA: Status: ACTIVE | Noted: 2017-02-21

## 2018-06-12 ENCOUNTER — RESULT REVIEW (OUTPATIENT)
Age: 83
End: 2018-06-12

## 2018-06-12 ENCOUNTER — APPOINTMENT (OUTPATIENT)
Dept: HEMATOLOGY ONCOLOGY | Facility: CLINIC | Age: 83
End: 2018-06-12
Payer: COMMERCIAL

## 2018-06-12 VITALS
SYSTOLIC BLOOD PRESSURE: 130 MMHG | TEMPERATURE: 98 F | WEIGHT: 108.03 LBS | OXYGEN SATURATION: 98 % | HEART RATE: 78 BPM | RESPIRATION RATE: 16 BRPM | DIASTOLIC BLOOD PRESSURE: 72 MMHG | HEIGHT: 55 IN | BODY MASS INDEX: 25 KG/M2

## 2018-06-12 DIAGNOSIS — D69.6 THROMBOCYTOPENIA, UNSPECIFIED: ICD-10-CM

## 2018-06-12 DIAGNOSIS — Z87.39 PERSONAL HISTORY OF OTHER DISEASES OF THE MUSCULOSKELETAL SYSTEM AND CONNECTIVE TISSUE: ICD-10-CM

## 2018-06-12 DIAGNOSIS — Z45.010 ENCOUNTER FOR CHECKING AND TESTING OF CARDIAC PACEMAKER PULSE GENERATOR [BATTERY]: ICD-10-CM

## 2018-06-12 DIAGNOSIS — D64.89 OTHER SPECIFIED ANEMIAS: ICD-10-CM

## 2018-06-12 DIAGNOSIS — D72.829 ELEVATED WHITE BLOOD CELL COUNT, UNSPECIFIED: ICD-10-CM

## 2018-06-12 DIAGNOSIS — Z86.79 PERSONAL HISTORY OF OTHER DISEASES OF THE CIRCULATORY SYSTEM: ICD-10-CM

## 2018-06-12 DIAGNOSIS — R04.0 EPISTAXIS: ICD-10-CM

## 2018-06-12 DIAGNOSIS — Z86.39 PERSONAL HISTORY OF OTHER ENDOCRINE, NUTRITIONAL AND METABOLIC DISEASE: ICD-10-CM

## 2018-06-12 LAB
ANION GAP SERPL CALC-SCNC: 16 MMOL/L
BASOPHILS # BLD AUTO: 0 K/UL — SIGNIFICANT CHANGE UP (ref 0–0.2)
BASOPHILS NFR BLD AUTO: 0.1 % — SIGNIFICANT CHANGE UP (ref 0–2)
BUN SERPL-MCNC: 21 MG/DL
CALCIUM SERPL-MCNC: 9.3 MG/DL
CHLORIDE SERPL-SCNC: 102 MMOL/L
CO2 SERPL-SCNC: 24 MMOL/L
CREAT SERPL-MCNC: 0.99 MG/DL
EOSINOPHIL # BLD AUTO: 0.1 K/UL — SIGNIFICANT CHANGE UP (ref 0–0.5)
EOSINOPHIL NFR BLD AUTO: 0.6 % — SIGNIFICANT CHANGE UP (ref 0–6)
GLUCOSE SERPL-MCNC: 124 MG/DL
HCT VFR BLD CALC: 28.7 % — LOW (ref 34.5–45)
HGB BLD-MCNC: 9.3 G/DL — LOW (ref 11.5–15.5)
INR PPP: 1.36 RATIO
IRON SATN MFR SERPL: 11 %
IRON SERPL-MCNC: 34 UG/DL
LYMPHOCYTES # BLD AUTO: 1.3 K/UL — SIGNIFICANT CHANGE UP (ref 1–3.3)
LYMPHOCYTES # BLD AUTO: 10.8 % — LOW (ref 13–44)
MCHC RBC-ENTMCNC: 27.2 PG — SIGNIFICANT CHANGE UP (ref 27–34)
MCHC RBC-ENTMCNC: 32.4 G/DL — SIGNIFICANT CHANGE UP (ref 32–36)
MCV RBC AUTO: 84 FL — SIGNIFICANT CHANGE UP (ref 80–100)
MONOCYTES # BLD AUTO: 3.4 K/UL — HIGH (ref 0–0.9)
MONOCYTES NFR BLD AUTO: 28.6 % — HIGH (ref 2–14)
NEUTROPHILS # BLD AUTO: 7.2 K/UL — SIGNIFICANT CHANGE UP (ref 1.8–7.4)
NEUTROPHILS NFR BLD AUTO: 59.9 % — SIGNIFICANT CHANGE UP (ref 43–77)
PLATELET # BLD AUTO: 124 K/UL — LOW (ref 150–400)
POTASSIUM SERPL-SCNC: 4 MMOL/L
PT BLD: 15.5 SEC
RBC # BLD: 3.42 M/UL — LOW (ref 3.8–5.2)
RBC # FLD: 18.6 % — HIGH (ref 10.3–14.5)
SODIUM SERPL-SCNC: 142 MMOL/L
TIBC SERPL-MCNC: 308 UG/DL
UIBC SERPL-MCNC: 274 UG/DL
WBC # BLD: 12 K/UL — HIGH (ref 3.8–10.5)
WBC # FLD AUTO: 12 K/UL — HIGH (ref 3.8–10.5)

## 2018-06-12 PROCEDURE — 99215 OFFICE O/P EST HI 40 MIN: CPT

## 2018-06-12 RX ORDER — FAMOTIDINE 20 MG/1
20 TABLET, FILM COATED ORAL DAILY
Refills: 0 | Status: ACTIVE | COMMUNITY
Start: 2018-06-12

## 2018-06-13 LAB
EPO SERPL-MCNC: 78.8 MIU/ML
FERRITIN SERPL-MCNC: 40 NG/ML
FOLATE SERPL-MCNC: 6.7 NG/ML
VIT B12 SERPL-MCNC: 1015 PG/ML

## 2018-06-14 LAB
DEPRECATED KAPPA LC FREE/LAMBDA SER: 0.98 RATIO
DEPRECATED KAPPA LC FREE/LAMBDA SER: 0.98 RATIO
IGA SER QL IEP: 549 MG/DL
IGG SER QL IEP: 1810 MG/DL
IGM SER QL IEP: 201 MG/DL
KAPPA LC CSF-MCNC: 4.57 MG/DL
KAPPA LC CSF-MCNC: 4.57 MG/DL
KAPPA LC SERPL-MCNC: 4.49 MG/DL
KAPPA LC SERPL-MCNC: 4.49 MG/DL

## 2018-06-18 LAB
ALBUMIN MFR SERPL ELPH: 41.4 %
ALBUMIN SERPL-MCNC: 3.4 G/DL
ALBUMIN/GLOB SERPL: 0.7 RATIO
ALPHA1 GLOB MFR SERPL ELPH: 6.4 %
ALPHA1 GLOB SERPL ELPH-MCNC: 0.5 G/DL
ALPHA2 GLOB MFR SERPL ELPH: 14.1 %
ALPHA2 GLOB SERPL ELPH-MCNC: 1.1 G/DL
B-GLOBULIN MFR SERPL ELPH: 14.2 %
B-GLOBULIN SERPL ELPH-MCNC: 1.2 G/DL
GAMMA GLOB FLD ELPH-MCNC: 1.9 G/DL
GAMMA GLOB MFR SERPL ELPH: 23.9 %
INTERPRETATION SERPL IEP-IMP: NORMAL
M PROTEIN SPEC IFE-MCNC: NORMAL
PROT SERPL-MCNC: 8.1 G/DL
PROT SERPL-MCNC: 8.1 G/DL

## 2018-06-21 LAB — METHYLMALONATE SERPL-SCNC: 432 NMOL/L

## 2018-06-27 ENCOUNTER — EMERGENCY (EMERGENCY)
Facility: HOSPITAL | Age: 83
LOS: 1 days | Discharge: HOME | End: 2018-06-27
Attending: EMERGENCY MEDICINE
Payer: MEDICARE

## 2018-06-27 VITALS
RESPIRATION RATE: 18 BRPM | DIASTOLIC BLOOD PRESSURE: 69 MMHG | HEART RATE: 83 BPM | OXYGEN SATURATION: 95 % | SYSTOLIC BLOOD PRESSURE: 145 MMHG

## 2018-06-27 VITALS
RESPIRATION RATE: 20 BRPM | HEART RATE: 96 BPM | TEMPERATURE: 98 F | DIASTOLIC BLOOD PRESSURE: 71 MMHG | OXYGEN SATURATION: 98 % | SYSTOLIC BLOOD PRESSURE: 145 MMHG

## 2018-06-27 DIAGNOSIS — Z90.710 ACQUIRED ABSENCE OF BOTH CERVIX AND UTERUS: Chronic | ICD-10-CM

## 2018-06-27 LAB
ALBUMIN SERPL ELPH-MCNC: 3.7 G/DL — SIGNIFICANT CHANGE UP (ref 3.3–5)
ALP SERPL-CCNC: 65 U/L — SIGNIFICANT CHANGE UP (ref 40–120)
ALT FLD-CCNC: 7 U/L — LOW (ref 10–45)
ANION GAP SERPL CALC-SCNC: 15 MMOL/L — SIGNIFICANT CHANGE UP (ref 5–17)
AST SERPL-CCNC: 11 U/L — SIGNIFICANT CHANGE UP (ref 10–40)
BASOPHILS # BLD AUTO: 0 K/UL — SIGNIFICANT CHANGE UP (ref 0–0.2)
BASOPHILS NFR BLD AUTO: 0.2 % — SIGNIFICANT CHANGE UP (ref 0–2)
BILIRUB SERPL-MCNC: 0.4 MG/DL — SIGNIFICANT CHANGE UP (ref 0.2–1.2)
BUN SERPL-MCNC: 16 MG/DL — SIGNIFICANT CHANGE UP (ref 7–23)
CALCIUM SERPL-MCNC: 9.4 MG/DL — SIGNIFICANT CHANGE UP (ref 8.4–10.5)
CHLORIDE SERPL-SCNC: 102 MMOL/L — SIGNIFICANT CHANGE UP (ref 96–108)
CO2 SERPL-SCNC: 24 MMOL/L — SIGNIFICANT CHANGE UP (ref 22–31)
CREAT SERPL-MCNC: 0.87 MG/DL — SIGNIFICANT CHANGE UP (ref 0.5–1.3)
EOSINOPHIL # BLD AUTO: 0 K/UL — SIGNIFICANT CHANGE UP (ref 0–0.5)
EOSINOPHIL NFR BLD AUTO: 0.6 % — SIGNIFICANT CHANGE UP (ref 0–6)
GLUCOSE SERPL-MCNC: 111 MG/DL — HIGH (ref 70–99)
HCT VFR BLD CALC: 28.5 % — LOW (ref 34.5–45)
HGB BLD-MCNC: 9.1 G/DL — LOW (ref 11.5–15.5)
LYMPHOCYTES # BLD AUTO: 0.5 K/UL — LOW (ref 1–3.3)
LYMPHOCYTES # BLD AUTO: 9.4 % — LOW (ref 13–44)
MCHC RBC-ENTMCNC: 27.3 PG — SIGNIFICANT CHANGE UP (ref 27–34)
MCHC RBC-ENTMCNC: 31.9 GM/DL — LOW (ref 32–36)
MCV RBC AUTO: 85.4 FL — SIGNIFICANT CHANGE UP (ref 80–100)
MONOCYTES # BLD AUTO: 1.4 K/UL — HIGH (ref 0–0.9)
MONOCYTES NFR BLD AUTO: 25 % — HIGH (ref 2–14)
NEUTROPHILS # BLD AUTO: 3.5 K/UL — SIGNIFICANT CHANGE UP (ref 1.8–7.4)
NEUTROPHILS NFR BLD AUTO: 64.9 % — SIGNIFICANT CHANGE UP (ref 43–77)
PLATELET # BLD AUTO: 107 K/UL — LOW (ref 150–400)
POTASSIUM SERPL-MCNC: 4 MMOL/L — SIGNIFICANT CHANGE UP (ref 3.5–5.3)
POTASSIUM SERPL-SCNC: 4 MMOL/L — SIGNIFICANT CHANGE UP (ref 3.5–5.3)
PROT SERPL-MCNC: 8 G/DL — SIGNIFICANT CHANGE UP (ref 6–8.3)
RBC # BLD: 3.33 M/UL — LOW (ref 3.8–5.2)
RBC # FLD: 17.2 % — HIGH (ref 10.3–14.5)
SODIUM SERPL-SCNC: 141 MMOL/L — SIGNIFICANT CHANGE UP (ref 135–145)
WBC # BLD: 5.4 K/UL — SIGNIFICANT CHANGE UP (ref 3.8–10.5)
WBC # FLD AUTO: 5.4 K/UL — SIGNIFICANT CHANGE UP (ref 3.8–10.5)

## 2018-06-27 PROCEDURE — 99284 EMERGENCY DEPT VISIT MOD MDM: CPT

## 2018-06-27 PROCEDURE — 99283 EMERGENCY DEPT VISIT LOW MDM: CPT

## 2018-06-27 PROCEDURE — 80053 COMPREHEN METABOLIC PANEL: CPT

## 2018-06-27 PROCEDURE — 85027 COMPLETE CBC AUTOMATED: CPT

## 2018-06-27 NOTE — ED ADULT NURSE NOTE - CHPI ED SYMPTOMS NEG
no blurred vision/no change in level of consciousness/no vomiting/no chills/no fever/no loss of consciousness/no nausea

## 2018-06-27 NOTE — ED ADULT NURSE NOTE - OBJECTIVE STATEMENT
89 y/o female a+ox3, pmhx pacemaker, HTN, depression, coming from home via EMS c/o epistaxis episode x 30 mins. Pt reports atraumatic onset of epistaxis from left nostril; upon EMS arrival pt applied pressure and bleeding stopped; pt seen in [facility] ED for similar complaint and her coumadin medication was stopped. Pt currently denies headache, feeling lightheaded, dizziness, difficulty breathing, excessive swallowing, choking or gagging, abdominal pain, n/v/d, fever or chills. VS documented, pt left in position of comfort, family at bedside, will reassess

## 2018-06-27 NOTE — ED PROVIDER NOTE - PROGRESS NOTE DETAILS
Pt complaining of generalized pruritus - will dc after labs. No nosebleeding in the ER. Pt labs not showing any possible etiology for itchiness. Pt amenable to discharge. will discharge with return precautions.

## 2018-06-27 NOTE — ED PROVIDER NOTE - NS ED ROS FT
ROS: denies HA, weakness, dizziness, fevers/chills, nausea/vomiting, chest pain, SOB, diaphoresis, abdominal pain, diarrhea, joint pain, neuro deficits, dysuria/hematuria, rash    +epistaxis

## 2018-06-27 NOTE — ED PROVIDER NOTE - OBJECTIVE STATEMENT
89yo F hx of HTN, AICD for Afib, recnetly on warfarin - currently not on warfarin for the last 2 weeks presents with transient epistaxis. stopped on own. was at [facility] 3 weeks ago for nosebleed which stopped in the ER. no warfarin for 2 weeks. denies dizziness/sob. denies f/c/recent travel or illnesses.

## 2018-06-27 NOTE — ED PROVIDER NOTE - PHYSICAL EXAMINATION
Gen: Well appearing, NAD  Head: NCAT  HEENT: PERRL, MMM, normal conjunctiva, anicteric, neck supple; no visible epistaxis. L nostril clear without dried blood. no blood in throat  Lung: CTAB, no adventitious sounds  CV: RRR, no murmurs, rubs or gallops  Abd: soft, NTND, no rebound or guarding, no CVAT  MSK: No edema, no visible deformities  Neuro: No focal neurologic deficits. CN II-XII grossly intact. 5/5 strength and normal sensation in all extremities.  Skin: Warm and dry, no evidence of rash  Psych: normal mood and affect

## 2018-08-06 ENCOUNTER — OUTPATIENT (OUTPATIENT)
Dept: OUTPATIENT SERVICES | Facility: HOSPITAL | Age: 83
LOS: 1 days | Discharge: HOME | End: 2018-08-06

## 2018-08-06 DIAGNOSIS — Z90.710 ACQUIRED ABSENCE OF BOTH CERVIX AND UTERUS: Chronic | ICD-10-CM

## 2018-08-06 DIAGNOSIS — D69.6 THROMBOCYTOPENIA, UNSPECIFIED: ICD-10-CM

## 2018-08-15 ENCOUNTER — APPOINTMENT (OUTPATIENT)
Dept: HEMATOLOGY ONCOLOGY | Facility: CLINIC | Age: 83
End: 2018-08-15

## 2018-09-04 ENCOUNTER — APPOINTMENT (OUTPATIENT)
Dept: HEMATOLOGY ONCOLOGY | Facility: CLINIC | Age: 83
End: 2018-09-04

## 2018-11-27 ENCOUNTER — APPOINTMENT (OUTPATIENT)
Dept: ELECTROPHYSIOLOGY | Facility: CLINIC | Age: 83
End: 2018-11-27
Payer: COMMERCIAL

## 2018-11-27 VITALS — DIASTOLIC BLOOD PRESSURE: 73 MMHG | HEART RATE: 81 BPM | OXYGEN SATURATION: 98 % | SYSTOLIC BLOOD PRESSURE: 158 MMHG

## 2018-11-27 PROCEDURE — 93280 PM DEVICE PROGR EVAL DUAL: CPT

## 2019-04-02 ENCOUNTER — APPOINTMENT (OUTPATIENT)
Dept: ELECTROPHYSIOLOGY | Facility: CLINIC | Age: 84
End: 2019-04-02

## 2019-04-10 NOTE — ED PROVIDER NOTE - CONSTITUTIONAL MENTATION
Post Cath Lab follow up completed by Cath Lab staff. Access site right Femoral Artery site assessed and WNL. Patient remains sedated on ventilator. Post Cath restrictions reviewed with family, no further questions. awake/alert

## 2019-05-09 ENCOUNTER — APPOINTMENT (OUTPATIENT)
Dept: ELECTROPHYSIOLOGY | Facility: CLINIC | Age: 84
End: 2019-05-09
Payer: COMMERCIAL

## 2019-05-09 ENCOUNTER — APPOINTMENT (OUTPATIENT)
Dept: CARDIOLOGY | Facility: CLINIC | Age: 84
End: 2019-05-09
Payer: COMMERCIAL

## 2019-05-09 ENCOUNTER — NON-APPOINTMENT (OUTPATIENT)
Age: 84
End: 2019-05-09

## 2019-05-09 VITALS
DIASTOLIC BLOOD PRESSURE: 80 MMHG | HEART RATE: 75 BPM | WEIGHT: 108 LBS | HEIGHT: 55 IN | SYSTOLIC BLOOD PRESSURE: 163 MMHG | OXYGEN SATURATION: 96 % | BODY MASS INDEX: 24.99 KG/M2

## 2019-05-09 PROCEDURE — 93000 ELECTROCARDIOGRAM COMPLETE: CPT

## 2019-05-09 PROCEDURE — 99215 OFFICE O/P EST HI 40 MIN: CPT

## 2019-05-09 PROCEDURE — 93280 PM DEVICE PROGR EVAL DUAL: CPT

## 2019-05-09 NOTE — REASON FOR VISIT
[Follow-Up - Clinic] : a clinic follow-up of [Pacemaker Evaluation] : pacemaker ~T evaluation ~C was performed [Hypertension] : hypertension

## 2019-05-11 NOTE — PHYSICAL EXAM
[General Appearance - Well Developed] : well developed [Well Groomed] : well groomed [Normal Appearance] : normal appearance [General Appearance - Well Nourished] : well nourished [No Deformities] : no deformities [Normal Conjunctiva] : the conjunctiva exhibited no abnormalities [General Appearance - In No Acute Distress] : no acute distress [No Oral Pallor] : no oral pallor [Normal Oral Mucosa] : normal oral mucosa [Eyelids - No Xanthelasma] : the eyelids demonstrated no xanthelasmas [Normal Jugular Venous A Waves Present] : normal jugular venous A waves present [No Oral Cyanosis] : no oral cyanosis [Normal Jugular Venous V Waves Present] : normal jugular venous V waves present [No Jugular Venous Gatica A Waves] : no jugular venous gatica A waves [Respiration, Rhythm And Depth] : normal respiratory rhythm and effort [Exaggerated Use Of Accessory Muscles For Inspiration] : no accessory muscle use [Auscultation Breath Sounds / Voice Sounds] : lungs were clear to auscultation bilaterally [Heart Rate And Rhythm] : heart rate and rhythm were normal [Heart Sounds] : normal S1 and S2 [Murmurs] : no murmurs present [Abdomen Soft] : soft [Abdomen Tenderness] : non-tender [Abdomen Mass (___ Cm)] : no abdominal mass palpated [Abnormal Walk] : normal gait [Gait - Sufficient For Exercise Testing] : the gait was sufficient for exercise testing [Nail Clubbing] : no clubbing of the fingernails [Cyanosis, Localized] : no localized cyanosis [Petechial Hemorrhages (___cm)] : no petechial hemorrhages [] : no rash [Skin Color & Pigmentation] : normal skin color and pigmentation [No Venous Stasis] : no venous stasis [Skin Lesions] : no skin lesions [No Xanthoma] : no  xanthoma was observed [No Skin Ulcers] : no skin ulcer [Oriented To Time, Place, And Person] : oriented to person, place, and time [No Anxiety] : not feeling anxious [Mood] : the mood was normal [Affect] : the affect was normal [FreeTextEntry1] : arthritic changes

## 2019-05-11 NOTE — DISCUSSION/SUMMARY
[___ Month(s)] : [unfilled] month(s) [FreeTextEntry1] : The patient is a 89-year-old female history of hypertension, atrial fibrillation, Medtronic PPM who is stable. She is deteriorating in exercise capacity but no evidence of heart failure. INR  has been therapeutic on 2.5mg daily. Pacemaker interrogation negative for increase rate, good battery life, sensing and pacing thresholds.  She does not want to do remote checks. Blood pressure stable.

## 2019-05-11 NOTE — HISTORY OF PRESENT ILLNESS
[FreeTextEntry1] : Raissa has been feeling well walking for exercise daily with occasional dizziness. Denies any palpitations or shortness of breath.  INR via home draws and monitored by PCP.

## 2019-08-13 ENCOUNTER — APPOINTMENT (OUTPATIENT)
Dept: ELECTROPHYSIOLOGY | Facility: CLINIC | Age: 84
End: 2019-08-13

## 2019-08-13 ENCOUNTER — APPOINTMENT (OUTPATIENT)
Dept: CARDIOLOGY | Facility: CLINIC | Age: 84
End: 2019-08-13

## 2019-09-04 NOTE — DISCHARGE NOTE ADULT - HOSPITAL COURSE
English 89F w/ PMH of AFib (on coumadin), HTN, thrombocytopenia (stable, followed as an outpatient), AV block s/p PPM (Medtronic), presenting from home with abdominal pain. In the ED, patient was afebrile, HR 80-90s, -160/70-80, RR 16, 96% room air.   A CT A/P was done which was negative for any acute pathology.  Given maalox and pepcid 20 IV. CBC was significant for leukocytosis to 21 with increased ANC as well as increased monocytes. Blood cultures were negative and her abdominal pain spontaneously resolved.  Hematology was consulted and believed these results to be most likely due to an acute inflammatory reaction to gastritis. She will be discharged home with plan to follow with her hematologist Dr. Steen and to have repeat CBC in 7-10 days.

## 2019-10-03 ENCOUNTER — APPOINTMENT (OUTPATIENT)
Dept: CARDIOLOGY | Facility: CLINIC | Age: 84
End: 2019-10-03
Payer: COMMERCIAL

## 2019-10-03 ENCOUNTER — APPOINTMENT (OUTPATIENT)
Dept: ELECTROPHYSIOLOGY | Facility: CLINIC | Age: 84
End: 2019-10-03
Payer: COMMERCIAL

## 2019-10-03 ENCOUNTER — LABORATORY RESULT (OUTPATIENT)
Age: 84
End: 2019-10-03

## 2019-10-03 ENCOUNTER — NON-APPOINTMENT (OUTPATIENT)
Age: 84
End: 2019-10-03

## 2019-10-03 VITALS
DIASTOLIC BLOOD PRESSURE: 70 MMHG | OXYGEN SATURATION: 97 % | SYSTOLIC BLOOD PRESSURE: 169 MMHG | WEIGHT: 106 LBS | HEART RATE: 72 BPM | BODY MASS INDEX: 24.53 KG/M2 | HEIGHT: 55 IN

## 2019-10-03 VITALS — DIASTOLIC BLOOD PRESSURE: 70 MMHG | SYSTOLIC BLOOD PRESSURE: 120 MMHG

## 2019-10-03 LAB
25(OH)D3 SERPL-MCNC: 17.8 NG/ML
ALBUMIN SERPL ELPH-MCNC: 4.9 G/DL
ALP BLD-CCNC: 69 U/L
ALT SERPL-CCNC: 7 U/L
ANION GAP SERPL CALC-SCNC: 15 MMOL/L
AST SERPL-CCNC: 17 U/L
BASOPHILS # BLD AUTO: 0.01 K/UL
BASOPHILS NFR BLD AUTO: 0.1 %
BILIRUB SERPL-MCNC: 0.7 MG/DL
BUN SERPL-MCNC: 18 MG/DL
CALCIUM SERPL-MCNC: 9.6 MG/DL
CHLORIDE SERPL-SCNC: 106 MMOL/L
CHOLEST SERPL-MCNC: 136 MG/DL
CHOLEST/HDLC SERPL: 3.4 RATIO
CO2 SERPL-SCNC: 25 MMOL/L
CREAT SERPL-MCNC: 0.94 MG/DL
EOSINOPHIL # BLD AUTO: 0.09 K/UL
EOSINOPHIL NFR BLD AUTO: 1.3 %
ESTIMATED AVERAGE GLUCOSE: 108 MG/DL
GLUCOSE SERPL-MCNC: 92 MG/DL
HBA1C MFR BLD HPLC: 5.4 %
HCT VFR BLD CALC: 43.6 %
HDLC SERPL-MCNC: 40 MG/DL
HGB BLD-MCNC: 14.1 G/DL
IMM GRANULOCYTES NFR BLD AUTO: 1.5 %
INR PPP: 1.1 RATIO
LDLC SERPL CALC-MCNC: 67 MG/DL
LYMPHOCYTES # BLD AUTO: 1.03 K/UL
LYMPHOCYTES NFR BLD AUTO: 15.1 %
MAN DIFF?: NORMAL
MCHC RBC-ENTMCNC: 30.1 PG
MCHC RBC-ENTMCNC: 32.3 GM/DL
MCV RBC AUTO: 93 FL
MONOCYTES # BLD AUTO: 1.15 K/UL
MONOCYTES NFR BLD AUTO: 16.9 %
NEUTROPHILS # BLD AUTO: 4.42 K/UL
NEUTROPHILS NFR BLD AUTO: 65.1 %
PLATELET # BLD AUTO: 130 K/UL
POTASSIUM SERPL-SCNC: 3.9 MMOL/L
PROT SERPL-MCNC: 8.2 G/DL
PT BLD: 12.6 SEC
RBC # BLD: 4.69 M/UL
RBC # FLD: 14.6 %
SODIUM SERPL-SCNC: 146 MMOL/L
TRIGL SERPL-MCNC: 147 MG/DL
TSH SERPL-ACNC: 4.65 UIU/ML
VIT B12 SERPL-MCNC: 1421 PG/ML
WBC # FLD AUTO: 6.8 K/UL

## 2019-10-03 PROCEDURE — 93280 PM DEVICE PROGR EVAL DUAL: CPT

## 2019-10-03 PROCEDURE — 93000 ELECTROCARDIOGRAM COMPLETE: CPT

## 2019-10-03 PROCEDURE — 99214 OFFICE O/P EST MOD 30 MIN: CPT

## 2019-10-03 RX ORDER — DONEPEZIL HYDROCHLORIDE 5 MG/1
5 TABLET ORAL DAILY
Qty: 90 | Refills: 3 | Status: ACTIVE | COMMUNITY
Start: 2019-10-03

## 2019-10-03 NOTE — PHYSICAL EXAM
[General Appearance - Well Developed] : well developed [Normal Appearance] : normal appearance [Well Groomed] : well groomed [General Appearance - Well Nourished] : well nourished [No Deformities] : no deformities [General Appearance - In No Acute Distress] : no acute distress [Normal Conjunctiva] : the conjunctiva exhibited no abnormalities [Eyelids - No Xanthelasma] : the eyelids demonstrated no xanthelasmas [Normal Oral Mucosa] : normal oral mucosa [No Oral Pallor] : no oral pallor [No Oral Cyanosis] : no oral cyanosis [Normal Jugular Venous A Waves Present] : normal jugular venous A waves present [Normal Jugular Venous V Waves Present] : normal jugular venous V waves present [No Jugular Venous Gatica A Waves] : no jugular venous gatica A waves [Respiration, Rhythm And Depth] : normal respiratory rhythm and effort [Exaggerated Use Of Accessory Muscles For Inspiration] : no accessory muscle use [Auscultation Breath Sounds / Voice Sounds] : lungs were clear to auscultation bilaterally [Heart Rate And Rhythm] : heart rate and rhythm were normal [Heart Sounds] : normal S1 and S2 [Murmurs] : no murmurs present [Abdomen Soft] : soft [Abdomen Tenderness] : non-tender [Abdomen Mass (___ Cm)] : no abdominal mass palpated [Abnormal Walk] : normal gait [Gait - Sufficient For Exercise Testing] : the gait was sufficient for exercise testing [Nail Clubbing] : no clubbing of the fingernails [Cyanosis, Localized] : no localized cyanosis [Petechial Hemorrhages (___cm)] : no petechial hemorrhages [Skin Color & Pigmentation] : normal skin color and pigmentation [] : no rash [No Venous Stasis] : no venous stasis [Skin Lesions] : no skin lesions [No Skin Ulcers] : no skin ulcer [No Xanthoma] : no  xanthoma was observed [Oriented To Time, Place, And Person] : oriented to person, place, and time [Affect] : the affect was normal [Mood] : the mood was normal [No Anxiety] : not feeling anxious [FreeTextEntry1] : arthritic changes

## 2019-10-03 NOTE — HISTORY OF PRESENT ILLNESS
[FreeTextEntry1] : Raissa has been feeling well walking for exercise daily with occasional dizziness. Denies any palpitations or shortness of breath.  INR via home draws and monitored by PCP but not reflective in chart currently.

## 2019-10-03 NOTE — REASON FOR VISIT
[Follow-Up - Clinic] : a clinic follow-up of [Hypertension] : hypertension [Pacemaker Evaluation] : pacemaker ~T evaluation ~C was performed

## 2019-10-03 NOTE — DISCUSSION/SUMMARY
[___ Month(s)] : [unfilled] month(s) [FreeTextEntry1] : The patient is a 89-year-old female history of hypertension, atrial fibrillation, Medtronic PPM who is stable. \par #1 Htn- on amlodipine\par #2 PAF- INR  has been therapeutic on 2.5mg daily. Pacemaker interrogation negative for increase rate, good battery life, sensing and pacing thresholds.  She does not want to do remote checks. \par #3 Neuro- mild dementia, started on aricept\par #4 Heme- consult reviewed, labs sent

## 2019-10-09 ENCOUNTER — APPOINTMENT (OUTPATIENT)
Dept: CV DIAGNOSITCS | Facility: HOSPITAL | Age: 84
End: 2019-10-09

## 2020-02-25 ENCOUNTER — APPOINTMENT (OUTPATIENT)
Dept: ELECTROPHYSIOLOGY | Facility: CLINIC | Age: 85
End: 2020-02-25
Payer: COMMERCIAL

## 2020-02-25 ENCOUNTER — APPOINTMENT (OUTPATIENT)
Dept: CARDIOLOGY | Facility: CLINIC | Age: 85
End: 2020-02-25
Payer: COMMERCIAL

## 2020-02-25 VITALS
HEIGHT: 55 IN | SYSTOLIC BLOOD PRESSURE: 156 MMHG | HEART RATE: 80 BPM | OXYGEN SATURATION: 96 % | DIASTOLIC BLOOD PRESSURE: 72 MMHG

## 2020-02-25 PROCEDURE — 93280 PM DEVICE PROGR EVAL DUAL: CPT

## 2020-02-25 PROCEDURE — 99214 OFFICE O/P EST MOD 30 MIN: CPT

## 2020-02-25 NOTE — DISCUSSION/SUMMARY
[___ Month(s)] : [unfilled] month(s) [FreeTextEntry1] : The patient is a 89-year-old female history of hypertension, atrial fibrillation, Medtronic PPM who is stable. \par #1 Htn- on amlodipine\par #2 PAF- INR not checked. Pacemaker interrogation negative for increase rate, good battery life, sensing and pacing thresholds.  She does not want to do remote checks. \par #3 Neuro- mild dementia,on aricept\par

## 2020-02-25 NOTE — HISTORY OF PRESENT ILLNESS
[FreeTextEntry1] : Raissa has been feeling well walking for exercise daily with occasional dizziness. Denies any palpitations or shortness of breath. Coumadin stopped by PCP.

## 2020-02-25 NOTE — PHYSICAL EXAM
[Normal Appearance] : normal appearance [General Appearance - Well Developed] : well developed [General Appearance - Well Nourished] : well nourished [Well Groomed] : well groomed [No Deformities] : no deformities [General Appearance - In No Acute Distress] : no acute distress [Normal Conjunctiva] : the conjunctiva exhibited no abnormalities [Eyelids - No Xanthelasma] : the eyelids demonstrated no xanthelasmas [Normal Oral Mucosa] : normal oral mucosa [No Oral Cyanosis] : no oral cyanosis [No Oral Pallor] : no oral pallor [Normal Jugular Venous V Waves Present] : normal jugular venous V waves present [Normal Jugular Venous A Waves Present] : normal jugular venous A waves present [No Jugular Venous Gatica A Waves] : no jugular venous gatica A waves [Respiration, Rhythm And Depth] : normal respiratory rhythm and effort [Auscultation Breath Sounds / Voice Sounds] : lungs were clear to auscultation bilaterally [Exaggerated Use Of Accessory Muscles For Inspiration] : no accessory muscle use [Heart Rate And Rhythm] : heart rate and rhythm were normal [Heart Sounds] : normal S1 and S2 [Murmurs] : no murmurs present [Abdomen Soft] : soft [Abdomen Tenderness] : non-tender [Abdomen Mass (___ Cm)] : no abdominal mass palpated [Gait - Sufficient For Exercise Testing] : the gait was sufficient for exercise testing [Abnormal Walk] : normal gait [FreeTextEntry1] : arthritic changes [Cyanosis, Localized] : no localized cyanosis [Nail Clubbing] : no clubbing of the fingernails [Petechial Hemorrhages (___cm)] : no petechial hemorrhages [Skin Color & Pigmentation] : normal skin color and pigmentation [No Venous Stasis] : no venous stasis [] : no rash [Skin Lesions] : no skin lesions [No Skin Ulcers] : no skin ulcer [No Xanthoma] : no  xanthoma was observed [Oriented To Time, Place, And Person] : oriented to person, place, and time [Affect] : the affect was normal [Mood] : the mood was normal [No Anxiety] : not feeling anxious

## 2020-03-08 ENCOUNTER — EMERGENCY (EMERGENCY)
Facility: HOSPITAL | Age: 85
LOS: 1 days | Discharge: HOME | End: 2020-03-08
Attending: EMERGENCY MEDICINE
Payer: MEDICAID

## 2020-03-08 VITALS
SYSTOLIC BLOOD PRESSURE: 163 MMHG | DIASTOLIC BLOOD PRESSURE: 83 MMHG | WEIGHT: 102.07 LBS | HEIGHT: 60 IN | HEART RATE: 83 BPM | RESPIRATION RATE: 18 BRPM | OXYGEN SATURATION: 95 % | TEMPERATURE: 98 F

## 2020-03-08 VITALS
RESPIRATION RATE: 18 BRPM | HEART RATE: 69 BPM | DIASTOLIC BLOOD PRESSURE: 65 MMHG | OXYGEN SATURATION: 94 % | SYSTOLIC BLOOD PRESSURE: 150 MMHG

## 2020-03-08 DIAGNOSIS — Z90.710 ACQUIRED ABSENCE OF BOTH CERVIX AND UTERUS: Chronic | ICD-10-CM

## 2020-03-08 PROCEDURE — 99283 EMERGENCY DEPT VISIT LOW MDM: CPT

## 2020-03-08 RX ADMIN — Medication 40 MILLIGRAM(S): at 16:14

## 2020-03-08 NOTE — ED PROVIDER NOTE - PHYSICAL EXAMINATION
Gen: NAD, non-toxic, conversational  Eyes: PERRLA, EOMI   HENT: Normocephalic, atraumatic. External ears normal, no rhinorrhea, moist mucous membranes.   CV: RRR, no M/R/G  Resp: CTAB, non-labored, speaking without difficulty on room air  Abd: soft, non tender, non rigid, no guarding or rebound tenderness  Back: No CVAT bilaterally, no midline ttp  Skin: dry, wwp, excoriations of the chest and low back overlying urticarial appearing rash   Neuro: AOx3, speech is fluent and appropriate  Psych: Mood good, affect euthymic

## 2020-03-08 NOTE — ED PROVIDER NOTE - PATIENT PORTAL LINK FT
You can access the FollowMyHealth Patient Portal offered by [facility] by registering at the following website: [email or web address]. By joining [employer]’s FollowMyHealth portal, you will also be able to view your health information using other applications (apps) compatible with our system.

## 2020-03-08 NOTE — ED PROVIDER NOTE - NSFOLLOWUPINSTRUCTIONS_ED_ALL_ED_FT
Use the cetirizine and hydroxizine as prescribed previously. Use the prednisone 40mg once a day for the next 4 days. Follow up with your primary doctor as needed for repeat evaluation.

## 2020-03-08 NOTE — ED PROVIDER NOTE - NSFOLLOWUPCLINICS_GEN_ALL_ED_FT
[facility] Allergy and Immunology  Allergy  [address] [place] [address]  Phone: [phone]  Fax:   Follow Up Time: 4-6 Days

## 2020-03-08 NOTE — ED PROVIDER NOTE - CLINICAL SUMMARY MEDICAL DECISION MAKING FREE TEXT BOX
89F presenting for evaluation of allergic reaction, on exam with hives, excorriations, already on two H1 blockers, will start oral steroid given no improvement in 3 days, obs for short period of time, well appearing and safe for discharge after meds. 89F presenting for evaluation of allergic reaction, on exam with hives, excoriations, already on two H1 blockers, will start oral steroid given no improvement in 3 days, obs for short period of time, well appearing and safe for discharge after meds.  Patient educated on concerning signs and features to return to the emergency department, in layman terms, including but not limited to: nausea, vomiting, fever, chills, persistent/worsening symptoms or any concerns at all. No immediate life threatening issues present on history or clinical exam. Patient is a safe disposition home, has capacity and insight into their condition, is ambulatory in the Emergency Department with no further questions and will follow up with their doctor(s) this week. Patient and family understand anticipatory guidance were given strict return and follow up precautions.  The patient and family have been informed of all concerning signs and symptoms to return to Emergency Department, the necessity to follow up with the PMD/Clinic/follow up provided within 2-3 days was explained, and the patient and/or family reports understanding of above with capacity and insight.

## 2020-03-08 NOTE — ED PROVIDER NOTE - ATTENDING CONTRIBUTION TO CARE
John Mcallister MD, FACEP: In this physician's medical judgement based on clinical history and physical exam, patient with excoriations without evidence of vesicles or multiple stages of healing wounds, likely contact dermatitis vs food allergy without anaphylaxis/oid signs and symptoms.   will have patient Follow up with primary medical doctor   See MDM above. John Mcallister MD, FACEP: In this physician's medical judgement based on clinical history and physical exam, patient with excoriations without evidence of vesicles or multiple stages of healing wounds, likely contact dermatitis vs food allergy without anaphylaxis/oid signs and symptoms.   will have patient Follow up with primary medical doctor   See MDM above.  patient already on triamcinolone and without much if any improvement.

## 2020-03-08 NOTE — ED PROVIDER NOTE - OBJECTIVE STATEMENT
Hx of allergic reaction started approx 3 days ago, unclear why, saw PMD started on cetirizine and atarax, has still been itchy all over, scratching her lower back and chest, per grand son unable to sleep well last night secondary to this irritation so came here for eval. is using a topical antihistamine as well. Hx of allergic reaction started approx 3 days ago, unclear why, saw PMD started on cetirizine and atarax, has still been itchy all over, scratching her lower back and chest, per grand son unable to sleep well last night secondary to this irritation so came here for eval. is using a topical cream as well but they are unsure of what type of cream they're using (son will get the medication and bring to the hospital). Hx of allergic reaction started approx 3 days ago, unclear why, saw PMD started on cetirizine and atarax, has still been itchy all over, scratching her lower back and chest, per grand son unable to sleep well last night secondary to this irritation so came here for eval. is using a topical cream as well but they are unsure of what type of cream they're using (son will get the medication and bring to the hospital). update- triamcinolone cream 0.1%

## 2020-03-08 NOTE — ED ADULT NURSE NOTE - OBJECTIVE STATEMENT
89 year old Female, PMH: HTN, Afib, comes to ER for rash. Patient prescribed antihistamines. Rash is on chest wall and generalized back area. Patient has been scratching causing small abrasions and breaks in the skin on chest and back. Rash has been present for 3 days, unknown cause of rash. Patient has no known allergies. No trouble swallowing, difficulty breathing, chest pain. A&Ox3, breathing spontaneous and unlabored with palpable pulses, skin color warm and dry, son at bedside to translate, Cypriot speaking. Appears well, in no distress. Bed locked and in lowest position with side rails up for safety.

## 2020-03-24 ENCOUNTER — APPOINTMENT (OUTPATIENT)
Dept: DERMATOLOGY | Facility: CLINIC | Age: 85
End: 2020-03-24

## 2020-06-02 ENCOUNTER — APPOINTMENT (OUTPATIENT)
Dept: ELECTROPHYSIOLOGY | Facility: CLINIC | Age: 85
End: 2020-06-02

## 2020-06-02 ENCOUNTER — APPOINTMENT (OUTPATIENT)
Dept: CARDIOLOGY | Facility: CLINIC | Age: 85
End: 2020-06-02
Payer: COMMERCIAL

## 2020-06-02 VITALS
BODY MASS INDEX: 26.52 KG/M2 | DIASTOLIC BLOOD PRESSURE: 77 MMHG | WEIGHT: 102 LBS | SYSTOLIC BLOOD PRESSURE: 160 MMHG | HEART RATE: 72 BPM

## 2020-06-02 VITALS — SYSTOLIC BLOOD PRESSURE: 110 MMHG | DIASTOLIC BLOOD PRESSURE: 64 MMHG

## 2020-06-02 PROCEDURE — 99214 OFFICE O/P EST MOD 30 MIN: CPT

## 2020-06-02 PROCEDURE — 93000 ELECTROCARDIOGRAM COMPLETE: CPT

## 2020-06-02 NOTE — PHYSICAL EXAM
[General Appearance - Well Developed] : well developed [Normal Appearance] : normal appearance [General Appearance - Well Nourished] : well nourished [Well Groomed] : well groomed [No Deformities] : no deformities [General Appearance - In No Acute Distress] : no acute distress [Normal Conjunctiva] : the conjunctiva exhibited no abnormalities [Eyelids - No Xanthelasma] : the eyelids demonstrated no xanthelasmas [Normal Oral Mucosa] : normal oral mucosa [No Oral Pallor] : no oral pallor [No Oral Cyanosis] : no oral cyanosis [Normal Jugular Venous A Waves Present] : normal jugular venous A waves present [No Jugular Venous Gatica A Waves] : no jugular venous gatica A waves [Normal Jugular Venous V Waves Present] : normal jugular venous V waves present [Respiration, Rhythm And Depth] : normal respiratory rhythm and effort [Auscultation Breath Sounds / Voice Sounds] : lungs were clear to auscultation bilaterally [Exaggerated Use Of Accessory Muscles For Inspiration] : no accessory muscle use [Heart Rate And Rhythm] : heart rate and rhythm were normal [Heart Sounds] : normal S1 and S2 [Murmurs] : no murmurs present [Abdomen Tenderness] : non-tender [Abdomen Soft] : soft [Abdomen Mass (___ Cm)] : no abdominal mass palpated [Abnormal Walk] : normal gait [Gait - Sufficient For Exercise Testing] : the gait was sufficient for exercise testing [Cyanosis, Localized] : no localized cyanosis [Nail Clubbing] : no clubbing of the fingernails [Petechial Hemorrhages (___cm)] : no petechial hemorrhages [Skin Color & Pigmentation] : normal skin color and pigmentation [] : no rash [Skin Lesions] : no skin lesions [No Venous Stasis] : no venous stasis [No Skin Ulcers] : no skin ulcer [No Xanthoma] : no  xanthoma was observed [Oriented To Time, Place, And Person] : oriented to person, place, and time [Affect] : the affect was normal [No Anxiety] : not feeling anxious [Mood] : the mood was normal [FreeTextEntry1] : arthritic changes

## 2020-06-02 NOTE — DISCUSSION/SUMMARY
[___ Month(s)] : [unfilled] month(s) [FreeTextEntry1] : The patient is a 89-year-old female history of hypertension, atrial fibrillation, Medtronic PPM who is losing weight and hypotensive.\par #1 Htn- on amlodipine, decrease to 5mg\par #2 PAF-  Pacemaker interrogation next visit\par #3 Neuro- mild dementia,on aricept, protein supplements\par

## 2020-06-02 NOTE — HISTORY OF PRESENT ILLNESS
[FreeTextEntry1] : Raissa has been feeling well walking for exercise daily with occasional dizziness. Denies any palpitations or shortness of breath.

## 2020-09-03 ENCOUNTER — APPOINTMENT (OUTPATIENT)
Dept: ELECTROPHYSIOLOGY | Facility: CLINIC | Age: 85
End: 2020-09-03
Payer: COMMERCIAL

## 2020-09-03 ENCOUNTER — NON-APPOINTMENT (OUTPATIENT)
Age: 85
End: 2020-09-03

## 2020-09-03 VITALS
OXYGEN SATURATION: 96 % | HEIGHT: 55 IN | SYSTOLIC BLOOD PRESSURE: 151 MMHG | BODY MASS INDEX: 23.14 KG/M2 | WEIGHT: 100 LBS | HEART RATE: 80 BPM | DIASTOLIC BLOOD PRESSURE: 76 MMHG

## 2020-09-03 PROCEDURE — 93280 PM DEVICE PROGR EVAL DUAL: CPT

## 2020-12-03 ENCOUNTER — APPOINTMENT (OUTPATIENT)
Dept: ELECTROPHYSIOLOGY | Facility: CLINIC | Age: 85
End: 2020-12-03
Payer: COMMERCIAL

## 2020-12-03 PROCEDURE — 93294 REM INTERROG EVL PM/LDLS PM: CPT

## 2020-12-03 PROCEDURE — 93296 REM INTERROG EVL PM/IDS: CPT

## 2021-01-05 ENCOUNTER — APPOINTMENT (OUTPATIENT)
Dept: CV DIAGNOSITCS | Facility: HOSPITAL | Age: 86
End: 2021-01-05

## 2021-01-05 ENCOUNTER — OUTPATIENT (OUTPATIENT)
Dept: OUTPATIENT SERVICES | Facility: HOSPITAL | Age: 86
LOS: 1 days | End: 2021-01-05
Payer: MEDICAID

## 2021-01-05 DIAGNOSIS — Z90.710 ACQUIRED ABSENCE OF BOTH CERVIX AND UTERUS: Chronic | ICD-10-CM

## 2021-01-05 DIAGNOSIS — I44.2 ATRIOVENTRICULAR BLOCK, COMPLETE: ICD-10-CM

## 2021-01-05 PROCEDURE — 93306 TTE W/DOPPLER COMPLETE: CPT | Mod: 26

## 2021-01-05 PROCEDURE — C8929: CPT

## 2021-03-09 ENCOUNTER — APPOINTMENT (OUTPATIENT)
Dept: ELECTROPHYSIOLOGY | Facility: CLINIC | Age: 86
End: 2021-03-09
Payer: COMMERCIAL

## 2021-03-09 ENCOUNTER — APPOINTMENT (OUTPATIENT)
Dept: CARDIOLOGY | Facility: CLINIC | Age: 86
End: 2021-03-09
Payer: MEDICARE

## 2021-03-09 VITALS — OXYGEN SATURATION: 98 % | DIASTOLIC BLOOD PRESSURE: 78 MMHG | SYSTOLIC BLOOD PRESSURE: 156 MMHG | HEART RATE: 75 BPM

## 2021-03-09 DIAGNOSIS — Z79.01 LONG TERM (CURRENT) USE OF ANTICOAGULANTS: ICD-10-CM

## 2021-03-09 PROCEDURE — 99214 OFFICE O/P EST MOD 30 MIN: CPT

## 2021-03-09 PROCEDURE — 99072 ADDL SUPL MATRL&STAF TM PHE: CPT

## 2021-03-09 PROCEDURE — 93280 PM DEVICE PROGR EVAL DUAL: CPT

## 2021-03-10 NOTE — DISCUSSION/SUMMARY
[___ Month(s)] : [unfilled] month(s) [FreeTextEntry1] : The patient is a 89-year-old female history of hypertension, atrial fibrillation, Medtronic PPM whose weight staabilized\par #1 Htn- continue amlodipine 5mg\par #2 PAF-  Pacemaker interrogation no events \par #3 Neuro- mild dementia,on aricept, protein supplements, good family support\par

## 2021-06-09 ENCOUNTER — APPOINTMENT (OUTPATIENT)
Dept: ELECTROPHYSIOLOGY | Facility: CLINIC | Age: 86
End: 2021-06-09
Payer: COMMERCIAL

## 2021-06-09 ENCOUNTER — NON-APPOINTMENT (OUTPATIENT)
Age: 86
End: 2021-06-09

## 2021-06-09 PROCEDURE — 93296 REM INTERROG EVL PM/IDS: CPT

## 2021-06-09 PROCEDURE — 93294 REM INTERROG EVL PM/LDLS PM: CPT

## 2021-06-10 ENCOUNTER — FORM ENCOUNTER (OUTPATIENT)
Age: 86
End: 2021-06-10

## 2021-08-10 ENCOUNTER — APPOINTMENT (OUTPATIENT)
Dept: CARDIOLOGY | Facility: CLINIC | Age: 86
End: 2021-08-10

## 2021-08-10 ENCOUNTER — APPOINTMENT (OUTPATIENT)
Dept: ELECTROPHYSIOLOGY | Facility: CLINIC | Age: 86
End: 2021-08-10

## 2021-08-25 ENCOUNTER — RESULT CHARGE (OUTPATIENT)
Age: 86
End: 2021-08-25

## 2021-08-26 ENCOUNTER — NON-APPOINTMENT (OUTPATIENT)
Age: 86
End: 2021-08-26

## 2021-08-26 ENCOUNTER — APPOINTMENT (OUTPATIENT)
Dept: CARDIOLOGY | Facility: CLINIC | Age: 86
End: 2021-08-26
Payer: COMMERCIAL

## 2021-08-26 ENCOUNTER — APPOINTMENT (OUTPATIENT)
Dept: ELECTROPHYSIOLOGY | Facility: CLINIC | Age: 86
End: 2021-08-26
Payer: COMMERCIAL

## 2021-08-26 ENCOUNTER — LABORATORY RESULT (OUTPATIENT)
Age: 86
End: 2021-08-26

## 2021-08-26 VITALS
DIASTOLIC BLOOD PRESSURE: 85 MMHG | WEIGHT: 103 LBS | SYSTOLIC BLOOD PRESSURE: 158 MMHG | OXYGEN SATURATION: 98 % | HEART RATE: 70 BPM | BODY MASS INDEX: 23.84 KG/M2 | HEIGHT: 55 IN

## 2021-08-26 VITALS — SYSTOLIC BLOOD PRESSURE: 134 MMHG | DIASTOLIC BLOOD PRESSURE: 78 MMHG

## 2021-08-26 PROCEDURE — 99072 ADDL SUPL MATRL&STAF TM PHE: CPT

## 2021-08-26 PROCEDURE — 99213 OFFICE O/P EST LOW 20 MIN: CPT

## 2021-08-26 PROCEDURE — 93000 ELECTROCARDIOGRAM COMPLETE: CPT

## 2021-08-26 PROCEDURE — 93280 PM DEVICE PROGR EVAL DUAL: CPT

## 2021-08-27 LAB
25(OH)D3 SERPL-MCNC: 40.1 NG/ML
ALBUMIN SERPL ELPH-MCNC: 4.3 G/DL
ALP BLD-CCNC: 66 U/L
ALT SERPL-CCNC: 8 U/L
ANION GAP SERPL CALC-SCNC: 12 MMOL/L
AST SERPL-CCNC: 11 U/L
BASOPHILS # BLD AUTO: 0 K/UL
BASOPHILS NFR BLD AUTO: 0 %
BILIRUB SERPL-MCNC: 0.6 MG/DL
BUN SERPL-MCNC: 20 MG/DL
CALCIUM SERPL-MCNC: 9.3 MG/DL
CHLORIDE SERPL-SCNC: 106 MMOL/L
CHOLEST SERPL-MCNC: 144 MG/DL
CO2 SERPL-SCNC: 24 MMOL/L
CREAT SERPL-MCNC: 1.14 MG/DL
EOSINOPHIL # BLD AUTO: 0.09 K/UL
EOSINOPHIL NFR BLD AUTO: 0.9 %
ESTIMATED AVERAGE GLUCOSE: 111 MG/DL
GLUCOSE SERPL-MCNC: 98 MG/DL
HBA1C MFR BLD HPLC: 5.5 %
HCT VFR BLD CALC: 42.2 %
HDLC SERPL-MCNC: 34 MG/DL
HGB BLD-MCNC: 13.7 G/DL
LDLC SERPL CALC-MCNC: 86 MG/DL
LYMPHOCYTES # BLD AUTO: 1.1 K/UL
LYMPHOCYTES NFR BLD AUTO: 11.2 %
MAN DIFF?: NORMAL
MCHC RBC-ENTMCNC: 29.9 PG
MCHC RBC-ENTMCNC: 32.5 GM/DL
MCV RBC AUTO: 92.1 FL
MONOCYTES # BLD AUTO: 2.12 K/UL
MONOCYTES NFR BLD AUTO: 21.5 %
NEUTROPHILS # BLD AUTO: 6.29 K/UL
NEUTROPHILS NFR BLD AUTO: 63.8 %
NONHDLC SERPL-MCNC: 111 MG/DL
PLATELET # BLD AUTO: 122 K/UL
POTASSIUM SERPL-SCNC: 4.1 MMOL/L
PROT SERPL-MCNC: 7.2 G/DL
RBC # BLD: 4.58 M/UL
RBC # FLD: 14.9 %
SODIUM SERPL-SCNC: 142 MMOL/L
TRIGL SERPL-MCNC: 122 MG/DL
TSH SERPL-ACNC: 1.01 UIU/ML
VIT B12 SERPL-MCNC: 1456 PG/ML
WBC # FLD AUTO: 9.86 K/UL

## 2021-08-27 NOTE — DISCUSSION/SUMMARY
[___ Month(s)] : in [unfilled] month(s) [FreeTextEntry1] : The patient is a 89-year-old female history of hypertension, atrial fibrillation, Medtronic PPM who is asymptomatic.\par #1 Htn- continue amlodipine 5mg\par #2 PAF-  Pacemaker interrogation few short SVT lasting seconds\par #3 Neuro- mild dementia,on aricept, protein supplements, good family support\par

## 2021-11-04 ENCOUNTER — APPOINTMENT (OUTPATIENT)
Dept: ELECTROPHYSIOLOGY | Facility: CLINIC | Age: 86
End: 2021-11-04
Payer: COMMERCIAL

## 2021-11-04 ENCOUNTER — APPOINTMENT (OUTPATIENT)
Dept: CARDIOLOGY | Facility: CLINIC | Age: 86
End: 2021-11-04
Payer: COMMERCIAL

## 2021-11-04 ENCOUNTER — NON-APPOINTMENT (OUTPATIENT)
Age: 86
End: 2021-11-04

## 2021-11-04 VITALS — SYSTOLIC BLOOD PRESSURE: 130 MMHG | DIASTOLIC BLOOD PRESSURE: 78 MMHG

## 2021-11-04 VITALS
HEART RATE: 89 BPM | HEIGHT: 55 IN | SYSTOLIC BLOOD PRESSURE: 174 MMHG | DIASTOLIC BLOOD PRESSURE: 86 MMHG | OXYGEN SATURATION: 96 %

## 2021-11-04 DIAGNOSIS — Z86.79 PERSONAL HISTORY OF OTHER DISEASES OF THE CIRCULATORY SYSTEM: ICD-10-CM

## 2021-11-04 PROCEDURE — 99072 ADDL SUPL MATRL&STAF TM PHE: CPT

## 2021-11-04 PROCEDURE — 99213 OFFICE O/P EST LOW 20 MIN: CPT

## 2021-11-04 PROCEDURE — 93280 PM DEVICE PROGR EVAL DUAL: CPT

## 2021-11-04 PROCEDURE — 93000 ELECTROCARDIOGRAM COMPLETE: CPT | Mod: 59

## 2021-11-06 NOTE — HISTORY OF PRESENT ILLNESS
[FreeTextEntry1] : Raissa offers no complaints of CP, palpitations, dizziness or SOB. Does not walk around the house much and never out alone. Here with daughter.

## 2021-11-06 NOTE — DISCUSSION/SUMMARY
[___ Month(s)] : in [unfilled] month(s) [FreeTextEntry1] : The patient is a 89-year-old female history of hypertension, atrial fibrillation, Medtronic PPM who is asymptomatic.\par #1 Htn- continue amlodipine 5mg\par #2 PAF-  Pacemaker interrogation few short SVT lasting seconds, pacer dependent\par #3 Neuro- mild dementia,on aricept, protein supplements, good family support\par

## 2021-11-26 ENCOUNTER — APPOINTMENT (OUTPATIENT)
Dept: ELECTROPHYSIOLOGY | Facility: CLINIC | Age: 86
End: 2021-11-26

## 2022-02-15 ENCOUNTER — APPOINTMENT (OUTPATIENT)
Dept: ELECTROPHYSIOLOGY | Facility: CLINIC | Age: 87
End: 2022-02-15
Payer: COMMERCIAL

## 2022-02-15 ENCOUNTER — APPOINTMENT (OUTPATIENT)
Dept: CARDIOLOGY | Facility: CLINIC | Age: 87
End: 2022-02-15
Payer: COMMERCIAL

## 2022-02-15 ENCOUNTER — NON-APPOINTMENT (OUTPATIENT)
Age: 87
End: 2022-02-15

## 2022-02-15 VITALS
DIASTOLIC BLOOD PRESSURE: 79 MMHG | HEIGHT: 55 IN | WEIGHT: 103 LBS | BODY MASS INDEX: 23.84 KG/M2 | SYSTOLIC BLOOD PRESSURE: 165 MMHG | HEART RATE: 76 BPM | OXYGEN SATURATION: 95 %

## 2022-02-15 DIAGNOSIS — I48.91 UNSPECIFIED ATRIAL FIBRILLATION: ICD-10-CM

## 2022-02-15 PROCEDURE — 93280 PM DEVICE PROGR EVAL DUAL: CPT

## 2022-02-15 PROCEDURE — 99072 ADDL SUPL MATRL&STAF TM PHE: CPT

## 2022-02-15 PROCEDURE — 99213 OFFICE O/P EST LOW 20 MIN: CPT

## 2022-02-15 PROCEDURE — 93000 ELECTROCARDIOGRAM COMPLETE: CPT | Mod: 59

## 2022-02-16 NOTE — HISTORY OF PRESENT ILLNESS
[FreeTextEntry1] : Raissa denies any CP, palpitations or SOB. Not much activity especially in winter.

## 2022-02-16 NOTE — DISCUSSION/SUMMARY
[FreeTextEntry1] : The patient is a 89-year-old female history of hypertension, atrial fibrillation, Medtronic PPM who is asymptomatic.\par #1 Htn- continue amlodipine 5mg\par #2 PAF-  Pacemaker interrogation few short SVT lasting seconds, pacer dependent\par #3 Neuro- mild dementia,on aricept, protein supplements, good family support\par

## 2022-04-20 ENCOUNTER — NON-APPOINTMENT (OUTPATIENT)
Age: 87
End: 2022-04-20

## 2022-04-20 ENCOUNTER — APPOINTMENT (OUTPATIENT)
Dept: ELECTROPHYSIOLOGY | Facility: CLINIC | Age: 87
End: 2022-04-20
Payer: COMMERCIAL

## 2022-04-20 PROCEDURE — 99072 ADDL SUPL MATRL&STAF TM PHE: CPT

## 2022-04-20 PROCEDURE — 93000 ELECTROCARDIOGRAM COMPLETE: CPT | Mod: 59

## 2022-04-20 PROCEDURE — 93280 PM DEVICE PROGR EVAL DUAL: CPT

## 2022-05-26 ENCOUNTER — APPOINTMENT (OUTPATIENT)
Dept: ELECTROPHYSIOLOGY | Facility: CLINIC | Age: 87
End: 2022-05-26
Payer: COMMERCIAL

## 2022-05-26 VITALS
SYSTOLIC BLOOD PRESSURE: 164 MMHG | DIASTOLIC BLOOD PRESSURE: 60 MMHG | BODY MASS INDEX: 23.84 KG/M2 | WEIGHT: 103 LBS | OXYGEN SATURATION: 96 % | HEART RATE: 71 BPM | HEIGHT: 55 IN

## 2022-05-26 PROCEDURE — 93280 PM DEVICE PROGR EVAL DUAL: CPT

## 2022-05-26 PROCEDURE — 99072 ADDL SUPL MATRL&STAF TM PHE: CPT

## 2022-06-13 ENCOUNTER — NON-APPOINTMENT (OUTPATIENT)
Age: 87
End: 2022-06-13

## 2022-06-14 ENCOUNTER — APPOINTMENT (OUTPATIENT)
Dept: ELECTROPHYSIOLOGY | Facility: CLINIC | Age: 87
End: 2022-06-14

## 2022-06-14 ENCOUNTER — OUTPATIENT (OUTPATIENT)
Dept: OUTPATIENT SERVICES | Facility: HOSPITAL | Age: 87
LOS: 1 days | End: 2022-06-14
Payer: MEDICARE

## 2022-06-14 DIAGNOSIS — Z90.710 ACQUIRED ABSENCE OF BOTH CERVIX AND UTERUS: Chronic | ICD-10-CM

## 2022-06-14 DIAGNOSIS — Z11.52 ENCOUNTER FOR SCREENING FOR COVID-19: ICD-10-CM

## 2022-06-14 LAB — SARS-COV-2 RNA SPEC QL NAA+PROBE: SIGNIFICANT CHANGE UP

## 2022-06-14 PROCEDURE — C9803: CPT

## 2022-06-14 PROCEDURE — U0003: CPT

## 2022-06-14 PROCEDURE — U0005: CPT

## 2022-06-17 ENCOUNTER — OUTPATIENT (OUTPATIENT)
Dept: OUTPATIENT SERVICES | Facility: HOSPITAL | Age: 87
LOS: 1 days | End: 2022-06-17
Payer: MEDICARE

## 2022-06-17 ENCOUNTER — TRANSCRIPTION ENCOUNTER (OUTPATIENT)
Age: 87
End: 2022-06-17

## 2022-06-17 VITALS
HEIGHT: 60 IN | DIASTOLIC BLOOD PRESSURE: 68 MMHG | WEIGHT: 106.92 LBS | OXYGEN SATURATION: 96 % | SYSTOLIC BLOOD PRESSURE: 168 MMHG | TEMPERATURE: 98 F | HEART RATE: 70 BPM | RESPIRATION RATE: 17 BRPM

## 2022-06-17 VITALS
RESPIRATION RATE: 18 BRPM | HEART RATE: 71 BPM | DIASTOLIC BLOOD PRESSURE: 69 MMHG | SYSTOLIC BLOOD PRESSURE: 147 MMHG | OXYGEN SATURATION: 97 %

## 2022-06-17 DIAGNOSIS — I44.1 ATRIOVENTRICULAR BLOCK, SECOND DEGREE: ICD-10-CM

## 2022-06-17 DIAGNOSIS — Z95.0 PRESENCE OF CARDIAC PACEMAKER: Chronic | ICD-10-CM

## 2022-06-17 DIAGNOSIS — Z90.710 ACQUIRED ABSENCE OF BOTH CERVIX AND UTERUS: Chronic | ICD-10-CM

## 2022-06-17 LAB
ALBUMIN SERPL ELPH-MCNC: 5 G/DL — SIGNIFICANT CHANGE UP (ref 3.3–5)
ALP SERPL-CCNC: 57 U/L — SIGNIFICANT CHANGE UP (ref 40–120)
ALT FLD-CCNC: 10 U/L — SIGNIFICANT CHANGE UP (ref 10–45)
ANION GAP SERPL CALC-SCNC: 12 MMOL/L — SIGNIFICANT CHANGE UP (ref 5–17)
APTT BLD: 34.6 SEC — SIGNIFICANT CHANGE UP (ref 27.5–35.5)
AST SERPL-CCNC: 18 U/L — SIGNIFICANT CHANGE UP (ref 10–40)
BILIRUB SERPL-MCNC: 0.9 MG/DL — SIGNIFICANT CHANGE UP (ref 0.2–1.2)
BUN SERPL-MCNC: 19 MG/DL — SIGNIFICANT CHANGE UP (ref 7–23)
CALCIUM SERPL-MCNC: 10.1 MG/DL — SIGNIFICANT CHANGE UP (ref 8.4–10.5)
CHLORIDE SERPL-SCNC: 105 MMOL/L — SIGNIFICANT CHANGE UP (ref 96–108)
CO2 SERPL-SCNC: 25 MMOL/L — SIGNIFICANT CHANGE UP (ref 22–31)
CREAT SERPL-MCNC: 1.24 MG/DL — SIGNIFICANT CHANGE UP (ref 0.5–1.3)
EGFR: 39 ML/MIN/1.73M2 — LOW
GLUCOSE SERPL-MCNC: 107 MG/DL — HIGH (ref 70–99)
HCT VFR BLD CALC: 42.8 % — SIGNIFICANT CHANGE UP (ref 34.5–45)
HGB BLD-MCNC: 13.5 G/DL — SIGNIFICANT CHANGE UP (ref 11.5–15.5)
INR BLD: 1.11 RATIO — SIGNIFICANT CHANGE UP (ref 0.88–1.16)
MCHC RBC-ENTMCNC: 28.7 PG — SIGNIFICANT CHANGE UP (ref 27–34)
MCHC RBC-ENTMCNC: 31.5 GM/DL — LOW (ref 32–36)
MCV RBC AUTO: 91.1 FL — SIGNIFICANT CHANGE UP (ref 80–100)
NRBC # BLD: 0 /100 WBCS — SIGNIFICANT CHANGE UP (ref 0–0)
PLATELET # BLD AUTO: 108 K/UL — LOW (ref 150–400)
POTASSIUM SERPL-MCNC: 3.9 MMOL/L — SIGNIFICANT CHANGE UP (ref 3.5–5.3)
POTASSIUM SERPL-SCNC: 3.9 MMOL/L — SIGNIFICANT CHANGE UP (ref 3.5–5.3)
PROT SERPL-MCNC: 8.2 G/DL — SIGNIFICANT CHANGE UP (ref 6–8.3)
PROTHROM AB SERPL-ACNC: 12.9 SEC — SIGNIFICANT CHANGE UP (ref 10.5–13.4)
RBC # BLD: 4.7 M/UL — SIGNIFICANT CHANGE UP (ref 3.8–5.2)
RBC # FLD: 14.5 % — SIGNIFICANT CHANGE UP (ref 10.3–14.5)
SODIUM SERPL-SCNC: 142 MMOL/L — SIGNIFICANT CHANGE UP (ref 135–145)
WBC # BLD: 7.49 K/UL — SIGNIFICANT CHANGE UP (ref 3.8–10.5)
WBC # FLD AUTO: 7.49 K/UL — SIGNIFICANT CHANGE UP (ref 3.8–10.5)

## 2022-06-17 PROCEDURE — 33228 REMV&REPLC PM GEN DUAL LEAD: CPT

## 2022-06-17 PROCEDURE — 93010 ELECTROCARDIOGRAM REPORT: CPT | Mod: 77

## 2022-06-17 PROCEDURE — 93005 ELECTROCARDIOGRAM TRACING: CPT

## 2022-06-17 PROCEDURE — 85027 COMPLETE CBC AUTOMATED: CPT

## 2022-06-17 PROCEDURE — 85610 PROTHROMBIN TIME: CPT

## 2022-06-17 PROCEDURE — 85730 THROMBOPLASTIN TIME PARTIAL: CPT

## 2022-06-17 PROCEDURE — 93010 ELECTROCARDIOGRAM REPORT: CPT

## 2022-06-17 PROCEDURE — 80053 COMPREHEN METABOLIC PANEL: CPT

## 2022-06-17 RX ORDER — CEFAZOLIN SODIUM 1 G
2000 VIAL (EA) INJECTION ONCE
Refills: 0 | Status: DISCONTINUED | OUTPATIENT
Start: 2022-06-17 | End: 2022-07-01

## 2022-06-17 RX ORDER — CEPHALEXIN 500 MG
1 CAPSULE ORAL
Qty: 9 | Refills: 0
Start: 2022-06-17 | End: 2022-06-19

## 2022-06-17 RX ORDER — WARFARIN SODIUM 2.5 MG/1
1 TABLET ORAL
Qty: 0 | Refills: 0 | DISCHARGE

## 2022-06-17 RX ORDER — METOPROLOL TARTRATE 50 MG
1 TABLET ORAL
Qty: 0 | Refills: 0 | DISCHARGE

## 2022-06-17 RX ORDER — DOCUSATE SODIUM 100 MG
1 CAPSULE ORAL
Qty: 0 | Refills: 0 | DISCHARGE

## 2022-06-17 RX ORDER — DONEPEZIL HYDROCHLORIDE 10 MG/1
1 TABLET, FILM COATED ORAL
Qty: 0 | Refills: 0 | DISCHARGE

## 2022-06-17 RX ORDER — CEPHALEXIN 500 MG
250 CAPSULE ORAL THREE TIMES A DAY
Refills: 0 | Status: DISCONTINUED | OUTPATIENT
Start: 2022-06-17 | End: 2022-07-01

## 2022-06-17 RX ORDER — ACETAMINOPHEN 500 MG
1000 TABLET ORAL ONCE
Refills: 0 | Status: DISCONTINUED | OUTPATIENT
Start: 2022-06-17 | End: 2022-06-17

## 2022-06-17 RX ORDER — AMLODIPINE BESYLATE 2.5 MG/1
1 TABLET ORAL
Qty: 0 | Refills: 0 | DISCHARGE

## 2022-06-17 RX ORDER — ACETAMINOPHEN 500 MG
750 TABLET ORAL ONCE
Refills: 0 | Status: DISCONTINUED | OUTPATIENT
Start: 2022-06-17 | End: 2022-07-01

## 2022-06-17 RX ADMIN — Medication 250 MILLIGRAM(S): at 16:13

## 2022-06-17 NOTE — ASU DISCHARGE PLAN (ADULT/PEDIATRIC) - CARE PROVIDER_API CALL
[facility],   [address] [place] [address]  Phone: (   )    -  Fax: (   )    -  Scheduled Appointment: 06/29/2022 12:00 AM

## 2022-06-17 NOTE — PATIENT PROFILE ADULT - FALL HARM RISK - HARM RISK INTERVENTIONS

## 2022-06-17 NOTE — ASU DISCHARGE PLAN (ADULT/PEDIATRIC) - PROVIDER TOKENS
FREE:[LAST:[EP clinic],PHONE:[(   )    -],FAX:[(   )    -],ADDRESS:[[address] [place] [address]],SCHEDULEDAPPT:[06/29/2022],SCHEDULEDAPPTTIME:[12:00 AM]]

## 2022-06-17 NOTE — H&P CARDIOLOGY - NSICDXPASTMEDICALHX_GEN_ALL_CORE_FT
PAST MEDICAL HISTORY:  Atrial fibrillation     AVB (atrioventricular block)     Dementia     Hypertension

## 2022-06-17 NOTE — ASU DISCHARGE PLAN (ADULT/PEDIATRIC) - ASU DC SPECIAL INSTRUCTIONSFT
WOUND CARE:  Do NOT scrub, rub, or pick at your incision site  AFTER 3 DAYS you may SHOWER  - use mild soap and gentle warm, water stream, pat dry  DO NOT apply lotions, creams, ointments, powder, perfumes to your incision site  DO NOT SOAK your site for 4-6 weeks ( no baths, no pools, no tubs, etc...)  wear loose clothing around site for 1-2 weeks  IF surgical tape was used DO NOT remove the strips, they will fall off after 7days, if glue was used, it will naturally fall off within 3 weeks  if staples were used, they will be removed in 7-10 days by your doctor    A follow up appointment in 7-14 days will be arranged before your discharge    ID CARD:   you will receive an ID CARD and device company booklet   - please carry that card with you at all times    ***CALL YOUR DOCTOR ***  IF you have fever, chills, body aches, or severe pain, swelling, redness, heat, yellow drainage from your incision site  IF bleeding  or significant new swelling from your puncture site  IF your experience lightheadedness, dizziness, or fainting spell.  IF unable to ge tin contact with your doctor, you may call the Cardiology Office at [facility] at [phone]

## 2022-06-17 NOTE — H&P CARDIOLOGY - HISTORY OF PRESENT ILLNESS
This is a 89 yo F w/ PMH of AFib (on coumadin), HTN, thrombocytopenia (stable, followed as an outpatient), AV block s/p PPM (Medtronic), and mild dementia ( on Donepezil).  Presents here today for dual PPM generator change.          This is a 89 yo  F ( only speaks Ukrainian, refuses  would like son Serge to translate instead) w/ PMH of AFib (no longer on A/C), HTN, thrombocytopenia (stable, followed as an outpatient), AV block s/p PPM (Medtronic), and mild dementia ( on Donepezil).  Presents here today for dual PPM generator change.

## 2022-06-17 NOTE — CHART NOTE - NSCHARTNOTEFT_GEN_A_CORE
S/P PPM gen change   Left infraclavicular implant site no hematoma, no bleeding, no ecchymosis  Post Device teaching done with son by primary [facility] nurse   ICD/ PPM card, booklet, and discharge instruction given to son by primary [facility] nurse  ok to be dc home today at 16:00 as per Dr Fung   Keflex 250mg PO TID x 3 days ( sent to [facility])   wound check appointment 6/29 11:40 am

## 2022-06-17 NOTE — ASU DISCHARGE PLAN (ADULT/PEDIATRIC) - NS MD DC FALL RISK RISK
For information on Fall & Injury Prevention, visit: [email or web address] OR  [email or web address] OR  https://www.cdc.gov/steadi/patient.html

## 2022-06-18 ENCOUNTER — NON-APPOINTMENT (OUTPATIENT)
Age: 87
End: 2022-06-18

## 2022-06-29 ENCOUNTER — NON-APPOINTMENT (OUTPATIENT)
Age: 87
End: 2022-06-29

## 2022-06-29 ENCOUNTER — APPOINTMENT (OUTPATIENT)
Dept: ELECTROPHYSIOLOGY | Facility: CLINIC | Age: 87
End: 2022-06-29
Payer: COMMERCIAL

## 2022-06-29 VITALS
DIASTOLIC BLOOD PRESSURE: 75 MMHG | OXYGEN SATURATION: 96 % | HEART RATE: 63 BPM | HEIGHT: 55 IN | SYSTOLIC BLOOD PRESSURE: 144 MMHG

## 2022-06-29 PROBLEM — I44.30 UNSPECIFIED ATRIOVENTRICULAR BLOCK: Chronic | Status: ACTIVE | Noted: 2022-06-17

## 2022-06-29 PROBLEM — F03.90 UNSPECIFIED DEMENTIA WITHOUT BEHAVIORAL DISTURBANCE: Chronic | Status: ACTIVE | Noted: 2022-06-17

## 2022-06-29 PROCEDURE — 99024 POSTOP FOLLOW-UP VISIT: CPT

## 2022-06-29 PROCEDURE — 93000 ELECTROCARDIOGRAM COMPLETE: CPT | Mod: 59

## 2022-06-29 PROCEDURE — 93280 PM DEVICE PROGR EVAL DUAL: CPT

## 2022-06-30 NOTE — H&P ADULT - PROBLEM/PLAN-2
Wife calling to check on status of this. Patient is scheduled tomorrow for extraction.    DISPLAY PLAN FREE TEXT

## 2022-09-15 ENCOUNTER — NON-APPOINTMENT (OUTPATIENT)
Age: 87
End: 2022-09-15

## 2022-09-15 ENCOUNTER — APPOINTMENT (OUTPATIENT)
Dept: ELECTROPHYSIOLOGY | Facility: CLINIC | Age: 87
End: 2022-09-15

## 2022-09-15 PROCEDURE — 93294 REM INTERROG EVL PM/LDLS PM: CPT

## 2022-09-15 PROCEDURE — 93296 REM INTERROG EVL PM/IDS: CPT

## 2022-09-27 NOTE — H&P ADULT - HISTORY OF PRESENT ILLNESS
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  Patient is Citizen of [place]-speaking and history obtained from son.  Per son, patient has been having abdominal pain for the past 2 weeks, mainly central.  States that she has been burping a lot and he was treating her with OTC medications like mylanta and simethicone.  These agents provided relief for a week, however the pain that has been stable for the past week was worse today.  Denies any diarrhea, no sick contacts, no fevers or chills.  Has been having regular BMs, but last BM was 2 days ago.     Patient went to an outpatient clinic a few weeks ago and was given a prescription for Bactrim which she took for 2 weeks according to the son.    Also endorses poor PO intake over the past few months with weight loss, but cannot quantify.      In the ED, patient was afebrile, HR 80-90s, -160/70-80, RR 16, 96% room air.   A CT A/P was done which was negative for any acute pathology. 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  Patient is Ukrainian-speaking and history obtained from son.  Per son, patient has been having abdominal pain for the past 2 weeks, mainly central.  States that she has been burping a lot and he was treating her with OTC medications like mylanta and simethicone.  These agents provided relief for a week, however the pain that has been stable for the past week was worse today.  Denies any diarrhea, no sick contacts, no fevers or chills.  Has been having regular BMs, but last BM was 2 days ago.     Patient went to an outpatient clinic a few weeks ago and was given a prescription for Bactrim which she took for 2 weeks according to the son.    Also endorses poor PO intake over the past few months with weight loss, but cannot quantify.      In the ED, patient was afebrile, HR 80-90s, -160/70-80, RR 16, 96% room air.   A CT A/P was done which was negative for any acute pathology.  Given maalox and pepcid 20 IV. Ivermectin Counseling:  Patient instructed to take medication on an empty stomach with a full glass of water.  Patient informed of potential adverse effects including but not limited to nausea, diarrhea, dizziness, itching, and swelling of the extremities or lymph nodes.  The patient verbalized understanding of the proper use and possible adverse effects of ivermectin.  All of the patient's questions and concerns were addressed.

## 2022-10-13 NOTE — DISCHARGE NOTE ADULT - NS MD DC PLAN IMMU FLU REF OTH
Hematology/Oncology Procedure Note    Bone Marrow Aspiration/Biopsy    Indication:     Bone marrow aspiration and biopsy procedure description, risks, and benefits were discussed in detail with the patient.  All questions were answered.  Informed consent was obtained and time-out performed.      The area of the right posterior iliac crest was prepped and draped using sterile technique. Local anesthetic with 2% Lidocaine.    Bone marrow aspiration and biopsy  was performed using sterile technique by Dr. Shelia Mishra with Dr. Tiffani Wylie assist and supervision. Specimens were obtained. No complications and less than 2 cc of blood loss.     The procedure was well tolerated and no local bleeding or other complications were observed.  Pressure was applied to the procedure site and a wound dressing was placed.  The patient and nursing staff were advised that the patient is to lie flat for 30 minutes post procedure and not to shower or change the dressing for 24 hours. Tylenol may be used if no contraindications for pain at the procedure site. Hematology/Oncology Procedure Note    Bone Marrow Aspiration/Biopsy     Indication:     Bone marrow aspiration and biopsy procedure description, risks, and benefits were discussed in detail with the patient.  All questions were answered.  Informed consent was obtained and time-out performed.      The area of the right posterior iliac crest was prepped and draped using sterile technique. Local anesthetic with 2% Lidocaine.    Bone marrow aspiration and biopsy  was performed using sterile technique by Dr. Shelia Mishra with Dr. Tiffani Wylie assist and supervision. Specimens were obtained. No complications and less than 2 cc of blood loss.     The procedure was well tolerated and no local bleeding or other complications were observed.  Pressure was applied to the procedure site and a wound dressing was placed.  The patient and nursing staff were advised that the patient is to lie flat for 30 minutes post procedure and not to shower or change the dressing for 24 hours. Tylenol may be used if no contraindications for pain at the procedure site. Out of season

## 2022-11-10 NOTE — ED PROVIDER NOTE - ATTENDING CONTRIBUTION TO CARE
Bariatric medicine referral.    
Goal BP < 130/80.  Compliant w/ meds.    - add diltiazem and decrease valsartan 40 mg daily   - enroll in digital medicine prgm  - risk factor and lifestyle modifications   
Goal LDL <70, last  10/2022 and noncompliant w/ statin therapy.    - encouraged statin compliance   - risk factor and lifestyle modifications   
Noted to have a dRCA  and is on no antianginals.    - start diltiazem  - continue other meds   - risk factor and lifestyle modifications   - check echo    
L sided nose bleed self resolved, no longer on ac. had been cauterized recently. ros pt notes itchy skin wo rash.   skin excoriated, no rash. nares w/o site of bleed / redness.  nosebleed resolved, check biliruben to eval for any elevation related to itch.

## 2022-11-17 ENCOUNTER — NON-APPOINTMENT (OUTPATIENT)
Age: 87
End: 2022-11-17

## 2022-11-17 ENCOUNTER — APPOINTMENT (OUTPATIENT)
Dept: ELECTROPHYSIOLOGY | Facility: CLINIC | Age: 87
End: 2022-11-17

## 2022-11-17 VITALS
HEIGHT: 55 IN | DIASTOLIC BLOOD PRESSURE: 71 MMHG | OXYGEN SATURATION: 99 % | HEART RATE: 69 BPM | SYSTOLIC BLOOD PRESSURE: 171 MMHG

## 2022-11-17 PROCEDURE — 93280 PM DEVICE PROGR EVAL DUAL: CPT

## 2022-11-17 PROCEDURE — 99072 ADDL SUPL MATRL&STAF TM PHE: CPT

## 2022-11-17 PROCEDURE — 93000 ELECTROCARDIOGRAM COMPLETE: CPT | Mod: 59

## 2023-01-01 ENCOUNTER — FORM ENCOUNTER (OUTPATIENT)
Age: 88
End: 2023-01-01

## 2023-01-01 ENCOUNTER — NON-APPOINTMENT (OUTPATIENT)
Age: 88
End: 2023-01-01

## 2023-01-01 ENCOUNTER — APPOINTMENT (OUTPATIENT)
Dept: ELECTROPHYSIOLOGY | Facility: CLINIC | Age: 88
End: 2023-01-01

## 2023-01-01 ENCOUNTER — EMERGENCY (EMERGENCY)
Facility: HOSPITAL | Age: 88
LOS: 1 days | Discharge: HOME | End: 2023-01-01
Attending: STUDENT IN AN ORGANIZED HEALTH CARE EDUCATION/TRAINING PROGRAM
Payer: MEDICARE

## 2023-01-01 ENCOUNTER — APPOINTMENT (OUTPATIENT)
Dept: ELECTROPHYSIOLOGY | Facility: CLINIC | Age: 88
End: 2023-01-01
Payer: COMMERCIAL

## 2023-01-01 ENCOUNTER — APPOINTMENT (OUTPATIENT)
Dept: CARDIOLOGY | Facility: CLINIC | Age: 88
End: 2023-01-01
Payer: COMMERCIAL

## 2023-01-01 VITALS
SYSTOLIC BLOOD PRESSURE: 172 MMHG | HEIGHT: 55 IN | DIASTOLIC BLOOD PRESSURE: 81 MMHG | OXYGEN SATURATION: 97 % | HEART RATE: 80 BPM

## 2023-01-01 VITALS
HEART RATE: 68 BPM | DIASTOLIC BLOOD PRESSURE: 70 MMHG | RESPIRATION RATE: 18 BRPM | TEMPERATURE: 99 F | SYSTOLIC BLOOD PRESSURE: 162 MMHG | OXYGEN SATURATION: 96 %

## 2023-01-01 VITALS
HEART RATE: 82 BPM | RESPIRATION RATE: 22 BRPM | SYSTOLIC BLOOD PRESSURE: 188 MMHG | DIASTOLIC BLOOD PRESSURE: 70 MMHG | WEIGHT: 95.9 LBS | TEMPERATURE: 98 F | OXYGEN SATURATION: 95 % | HEIGHT: 56 IN

## 2023-01-01 VITALS — DIASTOLIC BLOOD PRESSURE: 74 MMHG | SYSTOLIC BLOOD PRESSURE: 168 MMHG | OXYGEN SATURATION: 96 % | HEART RATE: 63 BPM

## 2023-01-01 DIAGNOSIS — Z90.710 ACQUIRED ABSENCE OF BOTH CERVIX AND UTERUS: Chronic | ICD-10-CM

## 2023-01-01 DIAGNOSIS — Z95.0 PRESENCE OF CARDIAC PACEMAKER: Chronic | ICD-10-CM

## 2023-01-01 LAB
ALBUMIN SERPL ELPH-MCNC: 4.2 G/DL — SIGNIFICANT CHANGE UP (ref 3.3–5)
ALBUMIN SERPL ELPH-MCNC: 4.2 G/DL — SIGNIFICANT CHANGE UP (ref 3.3–5)
ALP SERPL-CCNC: 62 U/L — SIGNIFICANT CHANGE UP (ref 40–120)
ALP SERPL-CCNC: 62 U/L — SIGNIFICANT CHANGE UP (ref 40–120)
ALT FLD-CCNC: 6 U/L — LOW (ref 10–45)
ALT FLD-CCNC: 6 U/L — LOW (ref 10–45)
ANION GAP SERPL CALC-SCNC: 15 MMOL/L — SIGNIFICANT CHANGE UP (ref 5–17)
ANION GAP SERPL CALC-SCNC: 15 MMOL/L — SIGNIFICANT CHANGE UP (ref 5–17)
ANISOCYTOSIS BLD QL: SLIGHT — SIGNIFICANT CHANGE UP
ANISOCYTOSIS BLD QL: SLIGHT — SIGNIFICANT CHANGE UP
APTT BLD: 30.4 SEC — SIGNIFICANT CHANGE UP (ref 24.5–35.6)
APTT BLD: 30.4 SEC — SIGNIFICANT CHANGE UP (ref 24.5–35.6)
AST SERPL-CCNC: 15 U/L — SIGNIFICANT CHANGE UP (ref 10–40)
AST SERPL-CCNC: 15 U/L — SIGNIFICANT CHANGE UP (ref 10–40)
BASE EXCESS BLDV CALC-SCNC: -2.6 MMOL/L — LOW (ref -2–3)
BASE EXCESS BLDV CALC-SCNC: -2.6 MMOL/L — LOW (ref -2–3)
BASOPHILS # BLD AUTO: 0 K/UL — SIGNIFICANT CHANGE UP (ref 0–0.2)
BASOPHILS # BLD AUTO: 0 K/UL — SIGNIFICANT CHANGE UP (ref 0–0.2)
BASOPHILS NFR BLD AUTO: 0 % — SIGNIFICANT CHANGE UP (ref 0–2)
BASOPHILS NFR BLD AUTO: 0 % — SIGNIFICANT CHANGE UP (ref 0–2)
BILIRUB SERPL-MCNC: 0.8 MG/DL — SIGNIFICANT CHANGE UP (ref 0.2–1.2)
BILIRUB SERPL-MCNC: 0.8 MG/DL — SIGNIFICANT CHANGE UP (ref 0.2–1.2)
BUN SERPL-MCNC: 23 MG/DL — SIGNIFICANT CHANGE UP (ref 7–23)
BUN SERPL-MCNC: 23 MG/DL — SIGNIFICANT CHANGE UP (ref 7–23)
CA-I SERPL-SCNC: 1.3 MMOL/L — SIGNIFICANT CHANGE UP (ref 1.15–1.33)
CA-I SERPL-SCNC: 1.3 MMOL/L — SIGNIFICANT CHANGE UP (ref 1.15–1.33)
CALCIUM SERPL-MCNC: 10.1 MG/DL — SIGNIFICANT CHANGE UP (ref 8.4–10.5)
CALCIUM SERPL-MCNC: 10.1 MG/DL — SIGNIFICANT CHANGE UP (ref 8.4–10.5)
CHLORIDE BLDV-SCNC: 104 MMOL/L — SIGNIFICANT CHANGE UP (ref 96–108)
CHLORIDE BLDV-SCNC: 104 MMOL/L — SIGNIFICANT CHANGE UP (ref 96–108)
CHLORIDE SERPL-SCNC: 102 MMOL/L — SIGNIFICANT CHANGE UP (ref 96–108)
CHLORIDE SERPL-SCNC: 102 MMOL/L — SIGNIFICANT CHANGE UP (ref 96–108)
CO2 BLDV-SCNC: 24 MMOL/L — SIGNIFICANT CHANGE UP (ref 22–26)
CO2 BLDV-SCNC: 24 MMOL/L — SIGNIFICANT CHANGE UP (ref 22–26)
CO2 SERPL-SCNC: 21 MMOL/L — LOW (ref 22–31)
CO2 SERPL-SCNC: 21 MMOL/L — LOW (ref 22–31)
CREAT SERPL-MCNC: 1.4 MG/DL — HIGH (ref 0.5–1.3)
CREAT SERPL-MCNC: 1.4 MG/DL — HIGH (ref 0.5–1.3)
CULTURE RESULTS: SIGNIFICANT CHANGE UP
EGFR: 33 ML/MIN/1.73M2 — LOW
EGFR: 33 ML/MIN/1.73M2 — LOW
ELLIPTOCYTES BLD QL SMEAR: SLIGHT — SIGNIFICANT CHANGE UP
ELLIPTOCYTES BLD QL SMEAR: SLIGHT — SIGNIFICANT CHANGE UP
EOSINOPHIL # BLD AUTO: 0 K/UL — SIGNIFICANT CHANGE UP (ref 0–0.5)
EOSINOPHIL # BLD AUTO: 0 K/UL — SIGNIFICANT CHANGE UP (ref 0–0.5)
EOSINOPHIL NFR BLD AUTO: 0 % — SIGNIFICANT CHANGE UP (ref 0–6)
EOSINOPHIL NFR BLD AUTO: 0 % — SIGNIFICANT CHANGE UP (ref 0–6)
GAS PNL BLDV: 136 MMOL/L — SIGNIFICANT CHANGE UP (ref 136–145)
GAS PNL BLDV: 136 MMOL/L — SIGNIFICANT CHANGE UP (ref 136–145)
GAS PNL BLDV: SIGNIFICANT CHANGE UP
GAS PNL BLDV: SIGNIFICANT CHANGE UP
GLUCOSE BLDV-MCNC: 109 MG/DL — HIGH (ref 70–99)
GLUCOSE BLDV-MCNC: 109 MG/DL — HIGH (ref 70–99)
GLUCOSE SERPL-MCNC: 118 MG/DL — HIGH (ref 70–99)
GLUCOSE SERPL-MCNC: 118 MG/DL — HIGH (ref 70–99)
HCO3 BLDV-SCNC: 22 MMOL/L — SIGNIFICANT CHANGE UP (ref 22–29)
HCO3 BLDV-SCNC: 22 MMOL/L — SIGNIFICANT CHANGE UP (ref 22–29)
HCT VFR BLD CALC: 27.3 % — LOW (ref 34.5–45)
HCT VFR BLD CALC: 27.3 % — LOW (ref 34.5–45)
HCT VFR BLDA CALC: 25 % — LOW (ref 34.5–46.5)
HCT VFR BLDA CALC: 25 % — LOW (ref 34.5–46.5)
HGB BLD CALC-MCNC: 8.3 G/DL — LOW (ref 11.7–16.1)
HGB BLD CALC-MCNC: 8.3 G/DL — LOW (ref 11.7–16.1)
HGB BLD-MCNC: 7.9 G/DL — LOW (ref 11.5–15.5)
HGB BLD-MCNC: 7.9 G/DL — LOW (ref 11.5–15.5)
HYPOCHROMIA BLD QL: SIGNIFICANT CHANGE UP
HYPOCHROMIA BLD QL: SIGNIFICANT CHANGE UP
INR BLD: 1.29 RATIO — HIGH (ref 0.85–1.18)
INR BLD: 1.29 RATIO — HIGH (ref 0.85–1.18)
LACTATE BLDV-MCNC: 2.3 MMOL/L — HIGH (ref 0.5–2)
LACTATE BLDV-MCNC: 2.3 MMOL/L — HIGH (ref 0.5–2)
LYMPHOCYTES # BLD AUTO: 0.69 K/UL — LOW (ref 1–3.3)
LYMPHOCYTES # BLD AUTO: 0.69 K/UL — LOW (ref 1–3.3)
LYMPHOCYTES # BLD AUTO: 10.5 % — LOW (ref 13–44)
LYMPHOCYTES # BLD AUTO: 10.5 % — LOW (ref 13–44)
MACROCYTES BLD QL: SLIGHT — SIGNIFICANT CHANGE UP
MACROCYTES BLD QL: SLIGHT — SIGNIFICANT CHANGE UP
MANUAL SMEAR VERIFICATION: SIGNIFICANT CHANGE UP
MANUAL SMEAR VERIFICATION: SIGNIFICANT CHANGE UP
MCHC RBC-ENTMCNC: 25.2 PG — LOW (ref 27–34)
MCHC RBC-ENTMCNC: 25.2 PG — LOW (ref 27–34)
MCHC RBC-ENTMCNC: 28.9 GM/DL — LOW (ref 32–36)
MCHC RBC-ENTMCNC: 28.9 GM/DL — LOW (ref 32–36)
MCV RBC AUTO: 87.2 FL — SIGNIFICANT CHANGE UP (ref 80–100)
MCV RBC AUTO: 87.2 FL — SIGNIFICANT CHANGE UP (ref 80–100)
MONOCYTES # BLD AUTO: 0.92 K/UL — HIGH (ref 0–0.9)
MONOCYTES # BLD AUTO: 0.92 K/UL — HIGH (ref 0–0.9)
MONOCYTES NFR BLD AUTO: 14 % — SIGNIFICANT CHANGE UP (ref 2–14)
MONOCYTES NFR BLD AUTO: 14 % — SIGNIFICANT CHANGE UP (ref 2–14)
NEUTROPHILS # BLD AUTO: 4.98 K/UL — SIGNIFICANT CHANGE UP (ref 1.8–7.4)
NEUTROPHILS # BLD AUTO: 4.98 K/UL — SIGNIFICANT CHANGE UP (ref 1.8–7.4)
NEUTROPHILS NFR BLD AUTO: 75.5 % — SIGNIFICANT CHANGE UP (ref 43–77)
NEUTROPHILS NFR BLD AUTO: 75.5 % — SIGNIFICANT CHANGE UP (ref 43–77)
NRBC # BLD: 1 /100 — HIGH (ref 0–0)
NRBC # BLD: 1 /100 — HIGH (ref 0–0)
OB PNL STL: NEGATIVE — SIGNIFICANT CHANGE UP
OB PNL STL: NEGATIVE — SIGNIFICANT CHANGE UP
PCO2 BLDV: 39 MMHG — SIGNIFICANT CHANGE UP (ref 39–42)
PCO2 BLDV: 39 MMHG — SIGNIFICANT CHANGE UP (ref 39–42)
PH BLDV: 7.37 — SIGNIFICANT CHANGE UP (ref 7.32–7.43)
PH BLDV: 7.37 — SIGNIFICANT CHANGE UP (ref 7.32–7.43)
PLAT MORPH BLD: NORMAL — SIGNIFICANT CHANGE UP
PLAT MORPH BLD: NORMAL — SIGNIFICANT CHANGE UP
PLATELET # BLD AUTO: 146 K/UL — LOW (ref 150–400)
PLATELET # BLD AUTO: 146 K/UL — LOW (ref 150–400)
PO2 BLDV: 25 MMHG — SIGNIFICANT CHANGE UP (ref 25–45)
PO2 BLDV: 25 MMHG — SIGNIFICANT CHANGE UP (ref 25–45)
POIKILOCYTOSIS BLD QL AUTO: SLIGHT — SIGNIFICANT CHANGE UP
POIKILOCYTOSIS BLD QL AUTO: SLIGHT — SIGNIFICANT CHANGE UP
POLYCHROMASIA BLD QL SMEAR: SLIGHT — SIGNIFICANT CHANGE UP
POLYCHROMASIA BLD QL SMEAR: SLIGHT — SIGNIFICANT CHANGE UP
POTASSIUM BLDV-SCNC: 4 MMOL/L — SIGNIFICANT CHANGE UP (ref 3.5–5.1)
POTASSIUM BLDV-SCNC: 4 MMOL/L — SIGNIFICANT CHANGE UP (ref 3.5–5.1)
POTASSIUM SERPL-MCNC: 3.8 MMOL/L — SIGNIFICANT CHANGE UP (ref 3.5–5.3)
POTASSIUM SERPL-MCNC: 3.8 MMOL/L — SIGNIFICANT CHANGE UP (ref 3.5–5.3)
POTASSIUM SERPL-SCNC: 3.8 MMOL/L — SIGNIFICANT CHANGE UP (ref 3.5–5.3)
POTASSIUM SERPL-SCNC: 3.8 MMOL/L — SIGNIFICANT CHANGE UP (ref 3.5–5.3)
PROT SERPL-MCNC: 8 G/DL — SIGNIFICANT CHANGE UP (ref 6–8.3)
PROT SERPL-MCNC: 8 G/DL — SIGNIFICANT CHANGE UP (ref 6–8.3)
PROTHROM AB SERPL-ACNC: 13.4 SEC — HIGH (ref 9.5–13)
PROTHROM AB SERPL-ACNC: 13.4 SEC — HIGH (ref 9.5–13)
RBC # BLD: 3.13 M/UL — LOW (ref 3.8–5.2)
RBC # BLD: 3.13 M/UL — LOW (ref 3.8–5.2)
RBC # FLD: 18.7 % — HIGH (ref 10.3–14.5)
RBC # FLD: 18.7 % — HIGH (ref 10.3–14.5)
RBC BLD AUTO: ABNORMAL
RBC BLD AUTO: ABNORMAL
SAO2 % BLDV: 28.1 % — LOW (ref 67–88)
SAO2 % BLDV: 28.1 % — LOW (ref 67–88)
SODIUM SERPL-SCNC: 138 MMOL/L — SIGNIFICANT CHANGE UP (ref 135–145)
SODIUM SERPL-SCNC: 138 MMOL/L — SIGNIFICANT CHANGE UP (ref 135–145)
SPECIMEN SOURCE: SIGNIFICANT CHANGE UP
STOMATOCYTES BLD QL SMEAR: SLIGHT — SIGNIFICANT CHANGE UP
STOMATOCYTES BLD QL SMEAR: SLIGHT — SIGNIFICANT CHANGE UP
WBC # BLD: 6.6 K/UL — SIGNIFICANT CHANGE UP (ref 3.8–10.5)
WBC # BLD: 6.6 K/UL — SIGNIFICANT CHANGE UP (ref 3.8–10.5)
WBC # FLD AUTO: 6.6 K/UL — SIGNIFICANT CHANGE UP (ref 3.8–10.5)
WBC # FLD AUTO: 6.6 K/UL — SIGNIFICANT CHANGE UP (ref 3.8–10.5)

## 2023-01-01 PROCEDURE — 93296 REM INTERROG EVL PM/IDS: CPT

## 2023-01-01 PROCEDURE — 93294 REM INTERROG EVL PM/LDLS PM: CPT

## 2023-01-01 PROCEDURE — 83605 ASSAY OF LACTIC ACID: CPT

## 2023-01-01 PROCEDURE — 73030 X-RAY EXAM OF SHOULDER: CPT

## 2023-01-01 PROCEDURE — 84295 ASSAY OF SERUM SODIUM: CPT

## 2023-01-01 PROCEDURE — 73030 X-RAY EXAM OF SHOULDER: CPT | Mod: 26,LT

## 2023-01-01 PROCEDURE — 93280 PM DEVICE PROGR EVAL DUAL: CPT

## 2023-01-01 PROCEDURE — 84132 ASSAY OF SERUM POTASSIUM: CPT

## 2023-01-01 PROCEDURE — 82803 BLOOD GASES ANY COMBINATION: CPT

## 2023-01-01 PROCEDURE — 82947 ASSAY GLUCOSE BLOOD QUANT: CPT

## 2023-01-01 PROCEDURE — 73502 X-RAY EXAM HIP UNI 2-3 VIEWS: CPT | Mod: 26,LT

## 2023-01-01 PROCEDURE — 85610 PROTHROMBIN TIME: CPT

## 2023-01-01 PROCEDURE — 73090 X-RAY EXAM OF FOREARM: CPT

## 2023-01-01 PROCEDURE — 87040 BLOOD CULTURE FOR BACTERIA: CPT

## 2023-01-01 PROCEDURE — 86900 BLOOD TYPING SEROLOGIC ABO: CPT

## 2023-01-01 PROCEDURE — 93000 ELECTROCARDIOGRAM COMPLETE: CPT

## 2023-01-01 PROCEDURE — 85018 HEMOGLOBIN: CPT

## 2023-01-01 PROCEDURE — 86850 RBC ANTIBODY SCREEN: CPT

## 2023-01-01 PROCEDURE — 73060 X-RAY EXAM OF HUMERUS: CPT | Mod: 26,LT

## 2023-01-01 PROCEDURE — 71045 X-RAY EXAM CHEST 1 VIEW: CPT

## 2023-01-01 PROCEDURE — 99213 OFFICE O/P EST LOW 20 MIN: CPT

## 2023-01-01 PROCEDURE — 73080 X-RAY EXAM OF ELBOW: CPT | Mod: 26,LT

## 2023-01-01 PROCEDURE — 85014 HEMATOCRIT: CPT

## 2023-01-01 PROCEDURE — 93971 EXTREMITY STUDY: CPT

## 2023-01-01 PROCEDURE — 93971 EXTREMITY STUDY: CPT | Mod: 26,LT

## 2023-01-01 PROCEDURE — 93005 ELECTROCARDIOGRAM TRACING: CPT

## 2023-01-01 PROCEDURE — 82272 OCCULT BLD FECES 1-3 TESTS: CPT

## 2023-01-01 PROCEDURE — 85730 THROMBOPLASTIN TIME PARTIAL: CPT

## 2023-01-01 PROCEDURE — 82330 ASSAY OF CALCIUM: CPT

## 2023-01-01 PROCEDURE — 71045 X-RAY EXAM CHEST 1 VIEW: CPT | Mod: 26

## 2023-01-01 PROCEDURE — 96375 TX/PRO/DX INJ NEW DRUG ADDON: CPT

## 2023-01-01 PROCEDURE — 99285 EMERGENCY DEPT VISIT HI MDM: CPT | Mod: 25

## 2023-01-01 PROCEDURE — 73080 X-RAY EXAM OF ELBOW: CPT

## 2023-01-01 PROCEDURE — 85025 COMPLETE CBC W/AUTO DIFF WBC: CPT

## 2023-01-01 PROCEDURE — 86901 BLOOD TYPING SEROLOGIC RH(D): CPT

## 2023-01-01 PROCEDURE — 73060 X-RAY EXAM OF HUMERUS: CPT

## 2023-01-01 PROCEDURE — 96374 THER/PROPH/DIAG INJ IV PUSH: CPT

## 2023-01-01 PROCEDURE — 70450 CT HEAD/BRAIN W/O DYE: CPT | Mod: MA

## 2023-01-01 PROCEDURE — 73502 X-RAY EXAM HIP UNI 2-3 VIEWS: CPT

## 2023-01-01 PROCEDURE — 73090 X-RAY EXAM OF FOREARM: CPT | Mod: 26,LT

## 2023-01-01 PROCEDURE — 99285 EMERGENCY DEPT VISIT HI MDM: CPT

## 2023-01-01 PROCEDURE — 80053 COMPREHEN METABOLIC PANEL: CPT

## 2023-01-01 PROCEDURE — 70450 CT HEAD/BRAIN W/O DYE: CPT | Mod: 26,MA

## 2023-01-01 PROCEDURE — 82435 ASSAY OF BLOOD CHLORIDE: CPT

## 2023-01-01 RX ORDER — CEPHALEXIN 500 MG
1 CAPSULE ORAL
Qty: 10 | Refills: 0
Start: 2023-01-01 | End: 2023-01-01

## 2023-01-01 RX ORDER — CEFAZOLIN SODIUM 1 G
1000 VIAL (EA) INJECTION ONCE
Refills: 0 | Status: COMPLETED | OUTPATIENT
Start: 2023-01-01 | End: 2023-01-01

## 2023-01-01 RX ORDER — ACETAMINOPHEN 500 MG
650 TABLET ORAL ONCE
Refills: 0 | Status: COMPLETED | OUTPATIENT
Start: 2023-01-01 | End: 2023-01-01

## 2023-01-01 RX ORDER — SODIUM CHLORIDE 9 MG/ML
1000 INJECTION INTRAMUSCULAR; INTRAVENOUS; SUBCUTANEOUS ONCE
Refills: 0 | Status: COMPLETED | OUTPATIENT
Start: 2023-01-01 | End: 2023-01-01

## 2023-01-01 RX ADMIN — SODIUM CHLORIDE 1000 MILLILITER(S): 9 INJECTION INTRAMUSCULAR; INTRAVENOUS; SUBCUTANEOUS at 14:54

## 2023-01-01 RX ADMIN — Medication 100 MILLIGRAM(S): at 14:40

## 2023-01-01 RX ADMIN — Medication 260 MILLIGRAM(S): at 13:45

## 2023-02-16 ENCOUNTER — FORM ENCOUNTER (OUTPATIENT)
Age: 88
End: 2023-02-16

## 2023-02-16 ENCOUNTER — APPOINTMENT (OUTPATIENT)
Dept: ELECTROPHYSIOLOGY | Facility: CLINIC | Age: 88
End: 2023-02-16

## 2023-08-01 NOTE — ED ADULT TRIAGE NOTE - AS TEMP SITE
History & Physical    SUBJECTIVE:     History of Present Illness:  52 year old male with PMH of HTN, WM on CPAP and GERD presents for evaluation of elevated left alex-diaphragm. Reports dyspnea with little exertion that has been worked up by Pulmonology and Cardiology for about a year. He reports he notices dyspnea at night after he takes a shower, when lying flat and when walking.  He walks on his breaks at work and his coworker commented on how heavy he was breathing. COVID positive in April 2020. No history of cervical spine or cardiac surgeries or procedures. 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 Report intermittent left upper extremity neuropathy, numbness and tingling.     Interval: 05/26/23: Patient called to cancel his surgery. Reported that he had been having more issues with neck pain and upper extremity neuropathies related to cervical spine disease. At that time, he elected to address the above issues prior to having surgery on his diaphragm. Patient to call and reschedule once his spine issues have been addressed.     Today patient reports he underwent fusion of of his cervical spine C4-C7 October 2022. Doing well following surgery. However, patient does report worsening SOB, reflux, loss of appetite, and occasional pain along left chest following dermatomal distribution. Denies fever, cough, chills, palpitations, dizziness, syncope.     PSH of appendectomy, hemorrhoidectomy, tonsillectomy and adenoidectomy. No significant trauma history.  Never smoker. Denies EtOH. Works in an office.     Chief Complaint   Patient presents with    Follow-up       Review of patient's allergies indicates:  No Known Allergies    Current Outpatient Medications   Medication Sig Dispense Refill    lisinopriL-hydrochlorothiazide (PRINZIDE,ZESTORETIC) 20-12.5 mg per tablet Take 1 tablet by mouth once daily.      meloxicam (MOBIC) 15 MG tablet Take  "15 mg by mouth once daily.      pantoprazole (PROTONIX) 40 MG tablet Take 40 mg by mouth 2 (two) times daily. Take prior to breakfast and supper.       No current facility-administered medications for this visit.     Facility-Administered Medications Ordered in Other Visits   Medication Dose Route Frequency Provider Last Rate Last Admin    0.9%  NaCl infusion   Intravenous Continuous Yuriy Wilburn MD   Stopped at 05/14/21 1047       Past Medical History:   Diagnosis Date    Hypertension     Internal hemorrhoids     Restless leg syndrome      Past Surgical History:   Procedure Laterality Date    APPENDECTOMY      ARTHROSCOPY OF KNEE Left     ESOPHAGOGASTRODUODENOSCOPY N/A 5/14/2021    Procedure: EGD (ESOPHAGOGASTRODUODENOSCOPY);  Surgeon: Yuriy Wilburn MD;  Location: [facility];  Service: Endoscopy;  Laterality: N/A;    HEMORRHOID SURGERY      TONSILLECTOMY      addenoidectomy    TYMPANOSTOMY TUBE PLACEMENT       Family History   Problem Relation Age of Onset    Hypertension Father      Social History     Tobacco Use    Smoking status: Never    Smokeless tobacco: Never   Substance Use Topics    Alcohol use: Never    Drug use: Never        Review of Systems:  Review of Systems   Constitutional:  Negative for appetite change, fatigue and fever.   HENT:  Negative for trouble swallowing and voice change.    Respiratory:  Positive for shortness of breath and wheezing. Negative for cough.    Gastrointestinal:  Negative for abdominal pain, diarrhea and vomiting.   Musculoskeletal:  Positive for arthralgias (LUE neuropathy) and neck pain. Negative for myalgias.   Neurological:  Negative for weakness, light-headedness, numbness and headaches.   Psychiatric/Behavioral:  Negative for confusion.        OBJECTIVE:     Vital Signs (Most Recent)  Pulse: 61 (08/04/23 0908)  BP: (!) 141/90 (08/04/23 0908)  SpO2: 97 % (08/04/23 0908)  6' 1" (1.854 m)  118.5 kg (261 lb 3.9 oz)   Body mass index is 34.47 kg/m².    Physical " Exam:  Physical Exam  Constitutional:       Appearance: Normal appearance.   Cardiovascular:      Rate and Rhythm: Normal rate and regular rhythm.      Pulses: Normal pulses.   Pulmonary:      Effort: Pulmonary effort is normal.      Breath sounds: Wheezing (Expiratory wheeze) present.   Abdominal:      General: Abdomen is flat.      Palpations: Abdomen is soft.   Musculoskeletal:      Right lower leg: No edema.      Left lower leg: No edema.   Neurological:      General: No focal deficit present.      Mental Status: He is alert and oriented to person, place, and time.   Psychiatric:         Mood and Affect: Mood normal.       Diagnostic Results:    6/25/21- CT Chest without Contrast:  The central airways are patent and clear.  Subsegmental atelectasis versus scarring in the bilateral lower lobes.  Mild chronic elevation bilateral hemidiaphragm.  No pleural or pericardial fluid.  No aortic aneurysm.  No hilar or mediastinal lymph node enlargement.  Visualized portion of the upper abdomen appears unremarkable.  No axillary lymph node enlargement.  No osseous abnormality.    6/25/21- PFTs:  FEV1- 2.7 63%  DLCO- 27 75%    7/15/21- 2D ECHO:      11/5/21- CT Cervical Spine:  1. Diffuse spondylotic change resulting in multilevel mild spinal canal narrowing and varying degrees of mild to severe foraminal narrowing with individual levels detailed above.  2. Reversal of the normal cervical curvature may be secondary to positioning or spasm    4/27/22- SNIFF: Elevated left hemidiaphragm with paradoxical upward motion, suggesting phrenic nerve palsy.    SNIFF 07/21/23:   Left hemidiaphragm appears elevated similar to priors.  Subtle reduced downward movement of the left hemidiaphragm as compared to the right with normal breathing and sniffing.  Impression: Findings consistent with left hemidiaphragm palsy.    ASSESSMENT/PLAN:     52 year old male with PMH of HTN, WM on CPAP and GERD presents for evaluation of elevated left  alex-diaphragm.    Previously offered robotic left hemidiaphragm plication 06/2022; however, this was cancelled at his request so that he could address his ongoing cervical spine symptoms.   Vague left costal margin discomfort  Exertional dyspnea  Left costal margin pain and exertional dyspnea may not be due to left hemidiaphragm paralysis.  I explained to the patient and his wife in great detail that there is the possibility that these symptoms will persist despite diaphragm plication.  I strongly suggested that the symptom etiology may be multifactorial.    Possible significant intrathoracic adhesion formation secondary to COVID infection    PLAN:Plan     Order updated ECHO   Scheduled for robotic left diaphragm plication  I have discussed the technical aspects, risks and benefits of robotic diaphragm plication possible left thoracotomy with the patient.  I informed the patient that the risks are the most common risks and that there are other less likely risks that are too numerous to elaborate.  The patient is aware and has agreed to undergo the procedure as detailed on the consent form.   Consents for blood and surgery obtained         oral

## 2023-08-09 NOTE — DISCUSSION/SUMMARY
[FreeTextEntry1] : The patient is a 89-year-old female HTN, Afib, Medtronic PPM who is asymptomatic. #1 HTN- c/w amlodipine 5mg #2 PAF-  Pacemaker interrogation SVT successfully treated with toprol 25mg, pacer dependent #3 Neuro- mild dementia,on aricept, protein supplements, good family support  [EKG obtained to assist in diagnosis and management of assessed problem(s)] : EKG obtained to assist in diagnosis and management of assessed problem(s)

## 2023-11-06 NOTE — ED ADULT NURSE NOTE - NSFALLHARMRISKINTERV_ED_ALL_ED
Assistance OOB with selected safe patient handling equipment if applicable/Assistance with ambulation/Communicate risk of Fall with Harm to all staff, patient, and family/Monitor gait and stability/Monitor for mental status changes and reorient to person, place, and time, as needed/Provide patient with walking aids/Provide visual cue: red socks, yellow wristband, yellow gown, etc/Reinforce activity limits and safety measures with patient and family/Toileting schedule using arm’s reach rule for commode and bathroom/Use of alarms - bed, stretcher, chair and/or video monitoring/Bed in lowest position, wheels locked, appropriate side rails in place/Call bell, personal items and telephone in reach/Instruct patient to call for assistance before getting out of bed/chair/stretcher/Non-slip footwear applied when patient is off stretcher/[place] to call system/Physically safe environment - no spills, clutter or unnecessary equipment/Purposeful Proactive Rounding/Room/bathroom lighting operational, light cord in reach Ketoconazole Pregnancy And Lactation Text: This medication is Pregnancy Category C and it isn't know if it is safe during pregnancy. It is also excreted in breast milk and breast feeding isn't recommended.

## 2023-11-06 NOTE — ED PROVIDER NOTE - PATIENT PORTAL LINK FT
You can access the FollowMyHealth Patient Portal offered by [facility] by registering at the following website: [email or web address]. By joining [employer]’s FollowMyHealth portal, you will also be able to view your health information using other applications (apps) compatible with our system.

## 2023-11-06 NOTE — ED PROVIDER NOTE - PHYSICAL EXAMINATION
Melo Cerna DO (PGY2)   Physical Exam:    Gen: NAD, AOx2  Head: NCAT  HEENT: EOMI, PEERLA  Lung: CTAB  CV: RRR, no murmurs, rubs or gallops  Abd: soft, NT, ND, no guarding, no rigidity, no rebound tenderness, no CVA tenderness   MSK: LUE ttp over midshaft humerus with ecchymosis, ecchymosis to elbow at antecubital fossa. Pain with active ROM. LUE distal pulses +2. No visible deformities. No midline ttp to entire spine. No pelvic instability.   Neuro: No focal sensory or motor deficits. Sensation intact to light touch all extremities.  Skin: Warm, well perfused, no rash, no leg swelling Melo Cerna DO (PGY2)   Physical Exam:    Gen: NAD, AOx2  Head: NCAT  HEENT: EOMI, PEERLA  Lung: CTAB  CV: RRR, no murmurs, rubs or gallops  Abd: soft, NT, ND, no guarding, no rigidity, no rebound tenderness, no CVA tenderness   MSK: LUE ttp over midshaft humerus with ecchymosis, erythema with ecchymosis to elbow at antecubital fossa. Pain with active ROM. LUE distal pulses +2. No visible deformities. No midline ttp to entire spine. No pelvic instability.   Neuro: No focal sensory or motor deficits. Sensation intact to light touch all extremities.  Skin: Warm, well perfused, no rash, no leg swelling Melo Cerna DO (PGY2)   Physical Exam:    Gen: NAD, AOx2  Head: NCAT  HEENT: EOMI, PEERLA  Lung: CTAB  CV: RRR, no murmurs, rubs or gallops  Abd: soft, NT, ND, no guarding, no rigidity, no rebound tenderness, no CVA tenderness   MSK: LUE ttp over midshaft humerus with ecchymosis, erythema with ecchymosis to elbow at antecubital fossa. Pain with active ROM. LUE distal pulses +2. No visible deformities. No midline ttp to entire spine. No pelvic instability.   : Chaperone TRINI Barrientos - adequate rectal tone, no bleeding   Neuro: No focal sensory or motor deficits. Sensation intact to light touch all extremities.  Skin: Warm, well perfused, no rash, no leg swelling

## 2023-11-06 NOTE — ED PROVIDER NOTE - ATTENDING CONTRIBUTION TO CARE
89 F w/ hx of afib (no longer a/c ) htn, thrombocytopenia av block s/p PPM mild dementia presents to the ER w/ L arm pain, pt has swelling of the L arm began near the elbow w/ skin discoloration, pt w/ no fevers,no chills, no cp, no sob, no nausea no vomiting, no lightheadedness, no dizziness, no back pain, no lower leg pain/swelling. pt accompanied by son jasmyn who is interpreting for the patient, pt nontoxic appearing, he is awake and alert in no distress, has clear lungs soft abdomen w/ pain in the Lhip and the L arm w/ swelling and bruising along the L arm, pt w/ intact rom of the elbow and shoulder wrist and  strength, majority of pain appears to be the medial aspect of the L elbow, pt w/ 2+ radial pulse b/l and 2+ DP pulse, pt w/ intact ROM Of the b/l lower legs, but w/ pain in the L hip w/ flexion, Plan for labs imaging and reassessment, findigns c/f possible fracture vs vte, vs acs vs cellulitis, dispo pending results

## 2023-11-06 NOTE — ED PROVIDER NOTE - CLINICAL SUMMARY MEDICAL DECISION MAKING FREE TEXT BOX
89  F w/ PMH of AFib (no longer on A/C), HTN, thrombocytopenia (stable, followed as an outpatient), AV block s/p PPM (Medtronic), and mild dementia ( on Donepezil) p/w left arm pain. Patient accompanied by son Adolfo for Sinhala translation as well as aide.  States that patient has isolated left arm pain for 3 days with ecchymosis and bruising.  Denies any trauma or falls.  States that they are constantly with the patient and have not witnessed any falls or additional trauma.  States that she is mentating at her baseline with no changes in appetite, bowel bladder dysfunction.  Vital signs remarkable for elevated BP, afebrile not hypoxic. Will obtain screening labs, XR.

## 2023-11-06 NOTE — ED ADULT TRIAGE NOTE - CHIEF COMPLAINT QUOTE
left arm pain, swelling, redness and deformity since Thursday  pt unable to move left arm pain  family denies any trauma

## 2023-11-06 NOTE — ED PROVIDER NOTE - OBJECTIVE STATEMENT
89  F w/ PMH of AFib (no longer on A/C), HTN, thrombocytopenia (stable, followed as an outpatient), AV block s/p PPM (Medtronic), and mild dementia ( on Donepezil) p/w left arm pain 89  F w/ PMH of AFib (no longer on A/C), HTN, thrombocytopenia (stable, followed as an outpatient), AV block s/p PPM (Medtronic), and mild dementia ( on Donepezil) p/w left arm pain. Patient accompanied by son Adolfo for Icelandic translation as well as aide.  States that patient has isolated left arm pain for 3 days with ecchymosis and bruising.  Denies any trauma or falls.  States that they are constantly with the patient and have not witnessed any falls or additional trauma.  States that she is mentating at her baseline with no changes in appetite, bowel bladder dysfunction.  Denies any recent fevers, nausea vomiting or diarrhea.  States they have not given her anything for pain prior to arrival.

## 2023-11-06 NOTE — ED PROVIDER NOTE - NSFOLLOWUPINSTRUCTIONS_ED_ALL_ED_FT
You were seen today for left arm pain    ALL your results regarding blood work and imaging were discussed with you.     To control your pain at home, you should take Ibuprofen 400 mg along with Tylenol 650mg-1000mg every 6 to 8 hours. Limit your maximum daily Tylenol from all sources to 4000mg. Be aware that many other medications contain acetaminophen which is also known as Tylenol. Taking Tylenol and Ibuprofen together has been shown to be more effective at relieving pain than taking them separately. These are both over the counter medications that you can  at your local pharmacy without a prescription. You need to respect all of the warnings on the bottles. You shouldn’t take these medications for more than a week without following up with your doctor. Both medications come with certain risks and side effects that you need to discuss with your doctor, especially if you are taking them for a prolonged period.    Please follow up with your primary care physician    You were sent antibiotics to the pharmacy - please take as directed.     You will be given a referral for orthopedics - Please follow up with them with X-ray results for any continuing arm pain    Try to avoid using the affected extremity whenever possible. You may apply ice to the affected area for no more than 15 minutes as needed for comfort. You may apply ice every 2-4 times a day as needed. Try to keep the affected extremity elevated whenever you are able to do so, as it will help to reduce swelling.

## 2023-11-06 NOTE — ED PROVIDER NOTE - PROGRESS NOTE DETAILS
Melo Cerna DO (PGY2)  Patient labs grossly nonactionable.  Negative x-rays for fracture or dislocation.  Left upper extremity ultrasound showing no evidence of clot or DVT.  Showing small subcutaneous nodule.  Discussed this finding with family member son at the bedside.  Patient was pending CT head.  Shared decision making done at bedside if CT head negative family member would like to take patient home due to patient age.  Risk benefits discussed for admission versus DC.  Family ember would like to be DC if CT head is negative.  If so will discharge on p.o. antibiotics Keflex.  We will anticipate sending this out to the night team Francia Edwards MD, PGY2: signed out to me pending CT head for eval of possible ICH. CTH resulted, shows no ICH. abx sent to pharmacy for tx of cellulitis. Patient reassessed at bedside, reports improvement in symptoms at this time.  All findings on labs and imaging communicated with patient and son, all questions answered at this time.  Patient given strict return precautions and directions to follow-up with their primary care physician within the next week.  Patient verbalized understanding and agrees with plan.  Patient ready for discharge.

## 2023-11-06 NOTE — ED ADULT NURSE NOTE - OBJECTIVE STATEMENT
89 year old female patient presents to ED with son and aide, c/o LUE arm pain and bruising x 3 days. No falls or trauma per son at bedside, but states they noticed the bruising after being moved to a chair for breakfast. Patient has difficulty ambulating and needs assistance transferring to chair. Patient awake and alert, responds to voice, hx of dementia but at baseline mental status per son. Small bruise also noted to L side of lower lip. Patient moans in pain upon palpation to L arm and localizes pain to medial aspect of forearm and upper arm. No other bruising noted. Denies fevers, chills, other complaints at home. Did not take pain medication PTA. Family aware of plan of care for imaging and monitoring.

## 2023-11-17 NOTE — ED ADULT NURSE NOTE - CAS DISCH CONDITION
Pt arrives with complaints of laceration to right thumb after hitting it on a table saw today. Last tetanus 2014   Stable

## 2024-01-01 ENCOUNTER — APPOINTMENT (OUTPATIENT)
Dept: CARDIOLOGY | Facility: CLINIC | Age: 89
End: 2024-01-01
Payer: COMMERCIAL

## 2024-01-01 ENCOUNTER — RX RENEWAL (OUTPATIENT)
Age: 89
End: 2024-01-01

## 2024-01-01 ENCOUNTER — NON-APPOINTMENT (OUTPATIENT)
Age: 89
End: 2024-01-01

## 2024-01-01 ENCOUNTER — APPOINTMENT (OUTPATIENT)
Dept: ELECTROPHYSIOLOGY | Facility: CLINIC | Age: 89
End: 2024-01-01
Payer: COMMERCIAL

## 2024-01-01 VITALS
OXYGEN SATURATION: 96 % | BODY MASS INDEX: 23.84 KG/M2 | DIASTOLIC BLOOD PRESSURE: 69 MMHG | SYSTOLIC BLOOD PRESSURE: 157 MMHG | WEIGHT: 103 LBS | HEIGHT: 55 IN | HEART RATE: 70 BPM

## 2024-01-01 DIAGNOSIS — I42.9 CARDIOMYOPATHY, UNSPECIFIED: ICD-10-CM

## 2024-01-01 DIAGNOSIS — I44.2 ATRIOVENTRICULAR BLOCK, COMPLETE: ICD-10-CM

## 2024-01-01 DIAGNOSIS — F03.90 UNSPECIFIED DEMENTIA W/OUT BEHAVIORAL DISTURBANCE: ICD-10-CM

## 2024-01-01 PROCEDURE — 99213 OFFICE O/P EST LOW 20 MIN: CPT

## 2024-01-01 PROCEDURE — 93296 REM INTERROG EVL PM/IDS: CPT

## 2024-01-01 PROCEDURE — 93000 ELECTROCARDIOGRAM COMPLETE: CPT

## 2024-01-01 PROCEDURE — 93294 REM INTERROG EVL PM/LDLS PM: CPT

## 2024-01-01 RX ORDER — FUROSEMIDE 20 MG/1
20 TABLET ORAL
Qty: 20 | Refills: 3 | Status: ACTIVE | COMMUNITY
Start: 2024-01-01 | End: 1900-01-01

## 2024-01-01 RX ORDER — METOPROLOL SUCCINATE 25 MG/1
25 TABLET, EXTENDED RELEASE ORAL DAILY
Qty: 90 | Refills: 1 | Status: ACTIVE | COMMUNITY
Start: 2021-03-09 | End: 1900-01-01

## 2024-01-11 NOTE — PATIENT PROFILE ADULT - NSPROPTRIGHTBILLOFRIGHTS_GEN_A_NUR
"Chief Complaint   Patient presents with    CPAP Follow Up       Initial /69   Pulse 59   Resp 20   SpO2 93%  Estimated body mass index is 38.69 kg/m  as calculated from the following:    Height as of 10/23/23: 1.753 m (5' 9\").    Weight as of 10/23/23: 118.8 kg (262 lb).    Medication Reconciliation: complete    Neck circumference:  inches /  centimeters.    DME:     Emily Angel MA  [facility]        "
patient

## 2024-01-26 PROBLEM — I42.9 CARDIOMYOPATHY: Status: ACTIVE | Noted: 2021-03-09

## 2024-01-26 PROBLEM — F03.90 DEMENTIA: Status: ACTIVE | Noted: 2018-06-12

## 2024-01-26 NOTE — DISCUSSION/SUMMARY
[FreeTextEntry1] : The patient is a 89-year-old female HTN, Afib, [employer] PPM who is asymptomatic. #1 HTN- c/w amlodipine 5mg #2 PAF-  Pacemaker interrogation SVT successfully treated with toprol 25mg, pacer dependent, device check next visit. #3 Neuro- mild dementia,on aricept, protein supplements, good family support  [EKG obtained to assist in diagnosis and management of assessed problem(s)] : EKG obtained to assist in diagnosis and management of assessed problem(s)

## 2024-03-04 NOTE — ED PROVIDER NOTE - SCRIBE NAME
SURGERY DISCHARGE INSTRUCTIONS    You may be drowsy or lightheaded after receiving sedation or anesthesia.    A responsible person should be with you for the next 24 hours.    FOLLOW UP: Call office to schedule follow-up appointment in 1-2  weeks.    DIET: Advance your diet as tolerated.     ACTIVITY: Rest today. Increase activity gradually.     SHOWER/BATHING: Okay to shower in 24 hours after procedure. No tub bathing, swimming or soaking until follow up with Dr. Velasco    WOUND CARE: You have a clean dressing applied. Change dressing daily and as needed for drainage.   MEDICATIONS: Take as prescribed. You may take over the counter ibuprofen or tylenol for pain as directed, limit total amount of acetaminophen (tylenol) to 3 grams per 24-hour period. Okay to resume anticoagulant medication after 24hrs. You may experience constipation while taking pain medication, you may take over the counter stool softeners (Docusate/Colace or Senokot-S) as directed.   The medication given to reverse general anesthesia and wake you up after surgery (Sugammadex) could interfere with your birth control and cause it to not work as effectively. Please use alternative method of birth control for at least seven days    DRAINS: Leave drains in until seen in office for follow-up.  Place clean, dry dressing around drain site, change daily or as needed.     SPECIAL INSTRUCTIONS:   Call physician if they or any other problems occur:  Call the office if you have a fever over >101F, or if your incision becomes red, tender, or drains more than a small amount of clear fluid  Fever over 101°    Redness, swelling, hardness or warmth at the operative site  Unrelieved nausea    Foul smelling or cloudy drainage at the operative site   Unrelieved pain    Blood soaked dressing (Some oozing may be normal)     Molina Gallegos

## 2024-05-24 ENCOUNTER — APPOINTMENT (OUTPATIENT)
Dept: ELECTROPHYSIOLOGY | Facility: CLINIC | Age: 89
End: 2024-05-24

## 2024-05-30 ENCOUNTER — APPOINTMENT (OUTPATIENT)
Dept: CARDIOLOGY | Facility: CLINIC | Age: 89
End: 2024-05-30

## 2024-05-30 ENCOUNTER — APPOINTMENT (OUTPATIENT)
Dept: ELECTROPHYSIOLOGY | Facility: CLINIC | Age: 89
End: 2024-05-30

## 2024-08-29 ENCOUNTER — APPOINTMENT (OUTPATIENT)
Dept: ELECTROPHYSIOLOGY | Facility: CLINIC | Age: 89
End: 2024-08-29

## 2025-01-10 NOTE — ED PROVIDER NOTE - WR ORDER STATUS 3
Medication refilled as patient now has scheduled appointment. 15mL for 3 refills.   Performed Resulted

## 2025-03-16 NOTE — ED PROVIDER NOTE - NSDCPRINTRESULTS_ED_ALL_ED
Requested medication(s) are due for refill today: Yes  Patient has already received a courtesy refill: No  Other reason request has been forwarded to provider:    Patient requests all Lab, Cardiology, and Radiology Results on their Discharge Instructions